# Patient Record
Sex: FEMALE | Race: WHITE
[De-identification: names, ages, dates, MRNs, and addresses within clinical notes are randomized per-mention and may not be internally consistent; named-entity substitution may affect disease eponyms.]

---

## 2017-12-04 ENCOUNTER — APPOINTMENT (OUTPATIENT)
Dept: OBGYN | Facility: CLINIC | Age: 37
End: 2017-12-04

## 2018-02-12 ENCOUNTER — OUTPATIENT (OUTPATIENT)
Dept: OUTPATIENT SERVICES | Facility: HOSPITAL | Age: 38
LOS: 1 days | Discharge: HOME | End: 2018-02-12

## 2018-02-12 ENCOUNTER — APPOINTMENT (OUTPATIENT)
Dept: OBGYN | Facility: CLINIC | Age: 38
End: 2018-02-12

## 2018-02-12 VITALS — DIASTOLIC BLOOD PRESSURE: 80 MMHG | WEIGHT: 208 LBS | BODY MASS INDEX: 35.7 KG/M2 | SYSTOLIC BLOOD PRESSURE: 120 MMHG

## 2018-02-21 DIAGNOSIS — Z01.419 ENCOUNTER FOR GYNECOLOGICAL EXAMINATION (GENERAL) (ROUTINE) WITHOUT ABNORMAL FINDINGS: ICD-10-CM

## 2018-02-26 LAB — HPV HIGH+LOW RISK DNA PNL CVX: NOT DETECTED

## 2018-11-26 ENCOUNTER — TRANSCRIPTION ENCOUNTER (OUTPATIENT)
Age: 38
End: 2018-11-26

## 2019-02-10 ENCOUNTER — EMERGENCY (EMERGENCY)
Facility: HOSPITAL | Age: 39
LOS: 0 days | Discharge: HOME | End: 2019-02-10
Admitting: EMERGENCY MEDICAL TECHNICIAN, BASIC

## 2019-02-10 ENCOUNTER — FORM ENCOUNTER (OUTPATIENT)
Age: 39
End: 2019-02-10

## 2019-02-10 VITALS
HEART RATE: 87 BPM | SYSTOLIC BLOOD PRESSURE: 134 MMHG | DIASTOLIC BLOOD PRESSURE: 80 MMHG | RESPIRATION RATE: 18 BRPM | TEMPERATURE: 96 F | OXYGEN SATURATION: 98 %

## 2019-02-10 DIAGNOSIS — N61.0 MASTITIS WITHOUT ABSCESS: ICD-10-CM

## 2019-02-10 DIAGNOSIS — N64.4 MASTODYNIA: ICD-10-CM

## 2019-02-10 DIAGNOSIS — Z79.2 LONG TERM (CURRENT) USE OF ANTIBIOTICS: ICD-10-CM

## 2019-02-10 DIAGNOSIS — Z88.0 ALLERGY STATUS TO PENICILLIN: ICD-10-CM

## 2019-02-10 NOTE — ED PROVIDER NOTE - PHYSICAL EXAMINATION
VITAL SIGNS: I have reviewed nursing notes and confirm.  CONSTITUTIONAL: Well-developed; well-nourished; in no acute distress.   SKIN: + minimal ender-areola redness to left breast, no discharge expressed from nipple (Chaperoned by Nurse Marisela)  HEAD: Normocephalic; atraumatic.  EYES:  conjunctiva and sclera clear.  ENT: No nasal discharge; airway clear.  EXT: Normal ROM.  No clubbing, cyanosis or edema.   NEURO: Alert, oriented, grossly unremarkable

## 2019-02-10 NOTE — ED PROVIDER NOTE - MEDICAL DECISION MAKING DETAILS
pt has mild redness around left areola, pen allergic so given clidaycin and strict f/u to see breast clinic within 1-2 days

## 2019-02-10 NOTE — ED PROVIDER NOTE - NSFOLLOWUPCLINICS_GEN_ALL_ED_FT
[facility]  Breast  [address]  [place] [address]  Phone: [phone]  Fax:   Follow Up Time:

## 2019-02-10 NOTE — ED PROVIDER NOTE - OBJECTIVE STATEMENT
Pt is a 37y/o female with a pmhx of left breast redness/pain x 2 days. Pt also note spurulent discharge from nipple. Pt denies fever, chills, weakness, numbness. Pt is not currently breast feeding.

## 2019-02-11 ENCOUNTER — OUTPATIENT (OUTPATIENT)
Dept: OUTPATIENT SERVICES | Facility: HOSPITAL | Age: 39
LOS: 1 days | Discharge: HOME | End: 2019-02-11

## 2019-02-11 DIAGNOSIS — N64.52 NIPPLE DISCHARGE: ICD-10-CM

## 2019-02-11 DIAGNOSIS — N64.4 MASTODYNIA: ICD-10-CM

## 2019-02-14 ENCOUNTER — LABORATORY RESULT (OUTPATIENT)
Age: 39
End: 2019-02-14

## 2019-02-15 ENCOUNTER — APPOINTMENT (OUTPATIENT)
Dept: BREAST CENTER | Facility: CLINIC | Age: 39
End: 2019-02-15
Payer: COMMERCIAL

## 2019-02-15 ENCOUNTER — OUTPATIENT (OUTPATIENT)
Dept: OUTPATIENT SERVICES | Facility: HOSPITAL | Age: 39
LOS: 1 days | Discharge: HOME | End: 2019-02-15

## 2019-02-15 VITALS
TEMPERATURE: 98.3 F | HEIGHT: 64 IN | SYSTOLIC BLOOD PRESSURE: 134 MMHG | DIASTOLIC BLOOD PRESSURE: 88 MMHG | WEIGHT: 202 LBS | BODY MASS INDEX: 34.49 KG/M2

## 2019-02-15 DIAGNOSIS — N60.42 MAMMARY DUCT ECTASIA OF LEFT BREAST: ICD-10-CM

## 2019-02-15 PROCEDURE — 99203 OFFICE O/P NEW LOW 30 MIN: CPT

## 2019-02-15 RX ORDER — ZINC OXIDE 13 %
CREAM (GRAM) TOPICAL
Qty: 1 | Refills: 0 | Status: ACTIVE | COMMUNITY
Start: 2019-02-15 | End: 1900-01-01

## 2019-02-15 RX ORDER — SULFAMETHOXAZOLE AND TRIMETHOPRIM 800; 160 MG/1; MG/1
800-160 TABLET ORAL TWICE DAILY
Qty: 14 | Refills: 1 | Status: ACTIVE | COMMUNITY
Start: 2019-02-15 | End: 1900-01-01

## 2019-02-17 ENCOUNTER — LABORATORY RESULT (OUTPATIENT)
Age: 39
End: 2019-02-17

## 2019-02-26 ENCOUNTER — FORM ENCOUNTER (OUTPATIENT)
Age: 39
End: 2019-02-26

## 2019-02-27 ENCOUNTER — OUTPATIENT (OUTPATIENT)
Dept: OUTPATIENT SERVICES | Facility: HOSPITAL | Age: 39
LOS: 1 days | Discharge: HOME | End: 2019-02-27

## 2019-02-27 DIAGNOSIS — R92.8 OTHER ABNORMAL AND INCONCLUSIVE FINDINGS ON DIAGNOSTIC IMAGING OF BREAST: ICD-10-CM

## 2019-02-27 DIAGNOSIS — N63.20 UNSPECIFIED LUMP IN THE LEFT BREAST, UNSPECIFIED QUADRANT: ICD-10-CM

## 2019-03-08 ENCOUNTER — APPOINTMENT (OUTPATIENT)
Dept: BREAST CENTER | Facility: CLINIC | Age: 39
End: 2019-03-08
Payer: COMMERCIAL

## 2019-03-08 VITALS
TEMPERATURE: 98.4 F | BODY MASS INDEX: 34.49 KG/M2 | WEIGHT: 202 LBS | HEIGHT: 64 IN | DIASTOLIC BLOOD PRESSURE: 86 MMHG | SYSTOLIC BLOOD PRESSURE: 124 MMHG

## 2019-03-08 DIAGNOSIS — N60.42 MAMMARY DUCT ECTASIA OF LEFT BREAST: ICD-10-CM

## 2019-03-08 PROCEDURE — 99213 OFFICE O/P EST LOW 20 MIN: CPT

## 2019-03-08 NOTE — ASSESSMENT
[FreeTextEntry1] : Rakel is a 38 premenopausal F with resolved L breast periductal mastitis and a BIRADS 3 right breast mass @10:00, 5 cm FN. \par \par ON exam, I was not able to palpate any suspicious masses in either breast. There was no evidence of infection at this time.  She will be scheduled for a R breast US on 8/12/19.  I will have her follow up after that. \par \par She has been taking Clindamycin for 4 days with minimal relief.  \par I would like her to switch her antibiotics to Bactrim to cover for MRSA as there is a higher resistance to clindamycin in the community.  I have recommended that she continue with warm compresses and breast massages. -- Her cultures grew back staph aureus which was resistant to clindamycin.  \par \par She is otherwise at an average risk for breast cancer and should start screening mammograms at age 42. \par \par ALl of her questions were answered.  She knows to call back with any further questions or concerns. \par \par PLAN: \par -R breast US in 6 months \par -f/up after

## 2019-03-08 NOTE — REVIEW OF SYSTEMS
[Fever] : no fever [Chills] : no chills [As Noted in HPI] : as noted in HPI [Skin Lesions] : no skin lesions [Skin Wound] : no skin wound [Breast Pain] : no breast pain [Breast Lump] : no breast lump [Negative] : Heme/Lymph

## 2019-03-08 NOTE — HISTORY OF PRESENT ILLNESS
[FreeTextEntry1] : Rakel is a 38 F with a left  breast periductal mastitis.\par \par She started to have left sided breast pain five days prior. She also had foul smelling purulent discharge from that nipple.  She was seen in the ED and given a prescription for Clindamycin which she has been taking for the past four days.  She had a slight relief of pain when starting the antibiotics.  She denies any fevers or chills, does think that the left breast is slightly warmer to touch then the right breast, but she denies any induration or redness of the area. \par \par She has no right sided breast complaints. \par \par She had a b/l mammogram and US on 19 which revealed a R breast BIRADS 3 mass @10:00, 5 cm FN, but no abnormal findings in her left breast. \par \par HISTORICAL RISK FACTORS: \par -no prior breast biopsy or surgery \par -family history of breast cancer in a materanl great grandmother \par -, age at first live birth was 31 \par -prior OCP use x 2-3 years, stopped >10 years prior; multiple rounds of IUI and IVF\par \par INTERVAL HISTORY: \par Rakel returns for a 2 week follow up for a left breast mastitis. Her left breast pain has resolved, there is no more nipple discharge, no erythema or induration and no fevers or chills.  She has since completed her antibiotics. \par \par She had a repeat L breast US on 19 which did not reveal any suspicious cystic or solid masses.  \par

## 2019-03-08 NOTE — PHYSICAL EXAM
[Normocephalic] : normocephalic [Atraumatic] : atraumatic [EOMI] : extra ocular movement intact [No Supraclavicular Adenopathy] : no supraclavicular adenopathy [No Cervical Adenopathy] : no cervical adenopathy [Examined in the supine and seated position] : examined in the supine and seated position [No dominant masses] : no dominant masses in right breast  [No dominant masses] : no dominant masses left breast [No Nipple Retraction] : no left nipple retraction [No Nipple Discharge] : no left nipple discharge [No Axillary Lymphadenopathy] : no left axillary lymphadenopathy [Soft] : abdomen soft [Not Tender] : non-tender [No Edema] : no edema [No Rashes] : no rashes [No Ulceration] : no ulceration [de-identified] : no suspicious masses were palpated in either breast, no signs of infection in either breast [de-identified] : no nipple discharge elicited

## 2019-03-08 NOTE — DATA REVIEWED
[FreeTextEntry1] : 2/17/19 -- R breast abscess culture \par -coag neg Staph, resistent to PCN, clindamycin and erythromycin\par \par \par XAM: US BREAST COMPLETE LT \par \par \par PROCEDURE DATE: 02/27/2019 \par \par \par \par INTERPRETATION: Clinical History / Reason for exam: Short-term follow-up. \par \par Additional history: The patient had taken oral antibiotics. \par \par The patient reports her last clinical breast examination was performed \par within the past month. \par \par Family history: Unknown \par \par Comparisons: Priors dated 2/11/2019. \par \par Findings: \par \par Ultrasound: \par \par Unilateral left whole breast ultrasound was performed. \par \par No suspicious solid or cystic masses. No axillary adenopathy. \par \par Impression: No sonographic evidence of malignancy. Please note that the \par patient has a probably benign hypoechoic mass incidentally noted in the \par right breast 10:00 location for which short-term mammographic and \par sonographic follow-up is recommended based on prior report. \par \par Recommendation: Follow-up unilateral diagnostic mammogram in 6 months. \par \par BI-RADS category 3: Probably Benign \par \par The above findings and recommendations were discussed with the patient at \par the time of the examination. \par \par \par \par \par \par \par CARLA BLACKMAN M.D., ATTENDING RADIOLOGIST \par This document has been electronically signed. Feb 27 2019 3:04PM

## 2019-03-08 NOTE — PAST MEDICAL HISTORY
[Menstruating] : The patient is menstruating [Menarche Age ____] : age at menarche was [unfilled] [History of Hormone Replacement Treatment] : has no history of hormone replacement treatment [Definite ___ (Date)] : the last menstrual period was [unfilled] [Total Preg ___] : G[unfilled] [Live Births ___] : P[unfilled]  [Age At Live Birth ___] : Age at live birth: [unfilled] [FreeTextEntry6] : yes IVF x 3, IUI x 1, clomid in past  [FreeTextEntry7] : yes in past x 2-3 years, stopped >10 years prior  [FreeTextEntry8] : yes x 8 months

## 2019-03-20 NOTE — DATA REVIEWED
[FreeTextEntry1] : EXAM: US BREAST LIMITED BI \par EXAM: MG MAMMO DIAG W TAMELA BI# \par \par \par PROCEDURE DATE: 02/11/2019 \par \par \par \par INTERPRETATION: Clinical indication/reason for exam: Patient presents with \par an area of focal pain in the left periareolar breast and nonbloody left \par nipple discharge. \par \par The patient reports her last clinical breast examination was performed over \par one year ago. \par \par Spot magnification imaging of the symptomatic area was performed in addition \par to routine mammographic projections. \par \par Computer-aided detection was utilized in the interpretation of this \par examination. \par \par No comparisons, baseline examination. \par \par Breast composition: There are scattered areas of fibroglandular density. \par \par Right: Oval circumscribed mass in the right superior breast, anterior depth. \par \par Left: There are no suspicious masses, areas of architectural distortion or \par cluster of microcalcifications in the left breast. The spot magnification \par views of the symptomatic area show no suspicious findings. \par \par Targeted Bilateral Breast Sonogram: \par \par Right: At the 10:00 location 5 cm from the nipple is a hypoechoic oval mass \par with circumscribed margins. Left: No solid/cystic lesions or areas of \par abnormal shadowing are seen. \par \par IMPRESSION: Mammographically and sonographically seen left breast oval \par hypoechoic mass with circumscribed margins at the 10:00 location 5 cm from \par the nipple. \par \par No mammographic or sonographic correlate for the reported area of focal pain \par in the left breast. Clinical follow-up is recommended and further management \par of this patient should be based on the level of clinical concern. \par \par Recommendation: Follow-up unilateral diagnostic mammogram and ultrasound in \par 6 months. \par \par BI-RADS category 3: Probably Benign \par \par \par \par \par MARISOL SILVA M.D., RESIDENT RADIOLOGIST \par This document has been electronically signed. \par ENOCH PAUL M.D., ATTENDING RADIOLOGIST \par This document has been electronically signed. Feb 11 2019 4:13PM

## 2019-03-20 NOTE — CONSULT LETTER
[Dear  ___] : Dear  [unfilled], [Consult Letter:] : I had the pleasure of evaluating your patient, [unfilled]. [Please see my note below.] : Please see my note below. [Consult Closing:] : Thank you very much for allowing me to participate in the care of this patient.  If you have any questions, please do not hesitate to contact me. [Sincerely,] : Sincerely, [FreeTextEntry2] : Vannessa Smith MD\par [address] [place] [address]\par [place] [phone] [FreeTextEntry3] : Nikky Antnoio MD \par Breast Surgical Oncologist\par [facility]\par [facility]\par [facility]\par

## 2019-03-20 NOTE — PAST MEDICAL HISTORY
[Menstruating] : The patient is menstruating [Menarche Age ____] : age at menarche was [unfilled] [Definite ___ (Date)] : the last menstrual period was [unfilled] [Total Preg ___] : G[unfilled] [Live Births ___] : P[unfilled]  [Age At Live Birth ___] : Age at live birth: [unfilled] [History of Hormone Replacement Treatment] : has no history of hormone replacement treatment [FreeTextEntry6] : yes IVF x 3, IUI x 1, clomid in past  [FreeTextEntry7] : yes in past x 2-3 years, stopped >10 years prior  [FreeTextEntry8] : yes x 8 months

## 2019-03-20 NOTE — REVIEW OF SYSTEMS
[As Noted in HPI] : as noted in HPI [Breast Pain] : breast pain [Negative] : Heme/Lymph [Fever] : no fever [Chills] : no chills [Skin Lesions] : no skin lesions [Skin Wound] : no skin wound [Breast Lump] : no breast lump

## 2019-03-20 NOTE — HISTORY OF PRESENT ILLNESS
[FreeTextEntry1] : Rakel is a 38 F with a left  breast periductal mastitis.\par \par She started to have left sided breast pain five days prior. She also had foul smelling purulent discharge from that nipple.  She was seen in the ED and given a prescription for Clindamycin which she has been taking for the past four days.  She had a slight relief of pain when starting the antibiotics.  She denies any fevers or chills, does think that the left breast is slightly warmer to touch then the right breast, but she denies any induration or redness of the area. \par \par She has no right sided breast complaints. \par \par She had a b/l mammogram and US on 19 which revealed a R breast BIRADS 3 mass @10:00, 5 cm FN, but no abnormal findings in her left breast. \par \par HISTORICAL RISK FACTORS: \par -no prior breast biopsy or surgery \par -family history of breast cancer in a materanl great grandmother \par -, age at first live birth was 31 \par -prior OCP use x 2-3 years, stopped >10 years prior; multiple rounds of IUI and IVF\par

## 2019-03-20 NOTE — PHYSICAL EXAM
[Normocephalic] : normocephalic [Atraumatic] : atraumatic [EOMI] : extra ocular movement intact [No Supraclavicular Adenopathy] : no supraclavicular adenopathy [No Cervical Adenopathy] : no cervical adenopathy [No dominant masses] : no dominant masses in right breast  [No dominant masses] : no dominant masses left breast [No Nipple Retraction] : no left nipple retraction [No Nipple Discharge] : no left nipple discharge [No Axillary Lymphadenopathy] : no left axillary lymphadenopathy [Soft] : abdomen soft [Not Tender] : non-tender [No Edema] : no edema [No Rashes] : no rashes [No Ulceration] : no ulceration [Examined in the supine and seated position] : examined in the supine and seated position [de-identified] : slightly warmer to touch on the inferior portion of her areola, but no erythema, no induration, no evidence of undrained abscess collection; was able to express about 3 cc of purulent discharge from a single duct in the superior area of her nipple.  This was sent for cultures.

## 2019-03-20 NOTE — ASSESSMENT
[FreeTextEntry1] : Rakel is a 38 premenopausal F with a L breast periductal mastitis and a BIRADS 3 right breast mass @10:00, 5 cm FN. \par \par ON exam, I was not able to palpate any suspicious masses in either breast.  She did have purulent discharge elicited from her left nipple which was sent for culture.  There was no evidence of any undrained abscess collections or significant mastitis.  \par \par She has been taking Clindamycin for 4 days with minimal relief.  \par I would like her to switch her antibiotics to Bactrim to cover for MRSA as there is a higher resistance to clindamycin in the community.  I have recommended that she continue with warm compresses and breast massages. \par \par I would like to repeat a L breast US in 2 weeks with possible US guided aspiration. I will have her follow up with me after repeat imaging. \par \par She is otherwise at an average risk for breast cancer and should start screening mammograms at age 42. \par \par ALl of her questions were answered.  She knows to call back with any further questions or concerns. \par \par PLAN: \par -Bactrim x 1 week \par -repeat L breast US, possible US guided drainage in 2 weeks \par -f/up after imaging

## 2019-04-29 ENCOUNTER — APPOINTMENT (OUTPATIENT)
Dept: BREAST CENTER | Facility: CLINIC | Age: 39
End: 2019-04-29
Payer: COMMERCIAL

## 2019-04-29 VITALS
TEMPERATURE: 98 F | BODY MASS INDEX: 34.49 KG/M2 | HEIGHT: 64 IN | SYSTOLIC BLOOD PRESSURE: 120 MMHG | WEIGHT: 202 LBS | DIASTOLIC BLOOD PRESSURE: 74 MMHG

## 2019-04-29 DIAGNOSIS — N64.4 MASTODYNIA: ICD-10-CM

## 2019-04-29 PROCEDURE — 99213 OFFICE O/P EST LOW 20 MIN: CPT

## 2019-04-29 NOTE — ASSESSMENT
[FreeTextEntry1] : Rakel is a 38 premenopausal F with resolved L breast periductal mastitis and a BIRADS 3 right breast mass @10:00, 5 cm FN; now with right breast pain. \par \par ON exam, I was not able to palpate any suspicious masses in either breast. There was no evidence of infection at this time.  She will be scheduled for a R breast US on 8/12/19.  I will have her follow up after that. \par \par In regards to her breast pain, it may be related to fibrocystic changes within her breast that are hormonally influenced. We spoke about possible interventions including evening primrose oil, supportive bras, and decreasing caffeine intake.  Although none of these have been consistently proven to improve breast pain, they may be tried.  If the pain becomes very severe, there have been studies of tamoxifen being effective for the treatment of breast pain, although there are risks with tamoxifen.  At this time she will try supportive measures.\par \par She has taken Clindamycin for in the past for 1 week with minimal relief.  \par I would like her to switch her antibiotics to Bactrim to cover for MRSA as there is a higher resistance to clindamycin in the community.  I have recommended that she continue with warm compresses and breast massages. -- Her cultures grew back staph aureus which was resistant to clindamycin.  \par \par She is otherwise at an average risk for breast cancer and should start screening mammograms at age 42. \par \par ALl of her questions were answered.  She knows to call back with any further questions or concerns. \par \par PLAN: \par -R breast US in 4 months\par -f/up after

## 2019-04-29 NOTE — PAST MEDICAL HISTORY
[Menstruating] : The patient is menstruating [Menarche Age ____] : age at menarche was [unfilled] [Total Preg ___] : G[unfilled] [Live Births ___] : P[unfilled]  [Age At Live Birth ___] : Age at live birth: [unfilled] [History of Hormone Replacement Treatment] : has no history of hormone replacement treatment [Definite ___ (Date)] : the last menstrual period was [unfilled] [FreeTextEntry6] : yes IVF x 3, IUI x 1, clomid in past  [FreeTextEntry7] : yes in past x 2-3 years, stopped >10 years prior  [FreeTextEntry8] : yes x 8 months

## 2019-04-29 NOTE — PHYSICAL EXAM
[Normocephalic] : normocephalic [Atraumatic] : atraumatic [EOMI] : extra ocular movement intact [No Supraclavicular Adenopathy] : no supraclavicular adenopathy [No Cervical Adenopathy] : no cervical adenopathy [Examined in the supine and seated position] : examined in the supine and seated position [No dominant masses] : no dominant masses in right breast  [No dominant masses] : no dominant masses left breast [No Nipple Retraction] : no left nipple retraction [No Nipple Discharge] : no left nipple discharge [No Axillary Lymphadenopathy] : no left axillary lymphadenopathy [Soft] : abdomen soft [Not Tender] : non-tender [No Edema] : no edema [No Rashes] : no rashes [No Ulceration] : no ulceration [de-identified] : no suspicious masses were palpated in either breast, no signs of infection in either breast [de-identified] : no suspicious mass palpated, no other skin changes or signs of infection [de-identified] : no nipple discharge elicited

## 2019-04-29 NOTE — HISTORY OF PRESENT ILLNESS
[FreeTextEntry1] : Rakel is a 38 F with a left  breast periductal mastitis.\par \par She started to have left sided breast pain five days prior. She also had foul smelling purulent discharge from that nipple.  She was seen in the ED and given a prescription for Clindamycin which she has been taking for the past four days.  She had a slight relief of pain when starting the antibiotics.  She denies any fevers or chills, does think that the left breast is slightly warmer to touch then the right breast, but she denies any induration or redness of the area. \par \par She has no right sided breast complaints. \par \par She had a b/l mammogram and US on 19 which revealed a R breast BIRADS 3 mass @10:00, 5 cm FN, but no abnormal findings in her left breast. \par \par HISTORICAL RISK FACTORS: \par -no prior breast biopsy or surgery \par -family history of breast cancer in a materanl great grandmother \par -, age at first live birth was 31 \par -prior OCP use x 2-3 years, stopped >10 years prior; multiple rounds of IUI and IVF\par \par INTERVAL HISTORY: \par Rakel returns for a problem visit, with a past history of left breast mastitis. She returns because she is having right LIQ pain.  This pain is intermittent and not associated with her menses.  She denies any other skin changes, no nipple discharge or retraction.  \par \par Her left breast pain has resolved, there is no nipple discharge, no erythema or induration and no fevers or chills. \par \par She had a repeat L breast US on 19 which did not reveal any suspicious cystic or solid masses.  \par

## 2019-08-11 ENCOUNTER — FORM ENCOUNTER (OUTPATIENT)
Age: 39
End: 2019-08-11

## 2019-08-12 ENCOUNTER — OUTPATIENT (OUTPATIENT)
Dept: OUTPATIENT SERVICES | Facility: HOSPITAL | Age: 39
LOS: 1 days | Discharge: HOME | End: 2019-08-12
Payer: COMMERCIAL

## 2019-08-12 DIAGNOSIS — R92.8 OTHER ABNORMAL AND INCONCLUSIVE FINDINGS ON DIAGNOSTIC IMAGING OF BREAST: ICD-10-CM

## 2019-08-12 PROCEDURE — 76642 ULTRASOUND BREAST LIMITED: CPT | Mod: 26,RT

## 2019-08-16 ENCOUNTER — APPOINTMENT (OUTPATIENT)
Dept: BREAST CENTER | Facility: CLINIC | Age: 39
End: 2019-08-16
Payer: COMMERCIAL

## 2019-08-16 VITALS
TEMPERATURE: 98.4 F | HEIGHT: 64 IN | DIASTOLIC BLOOD PRESSURE: 80 MMHG | SYSTOLIC BLOOD PRESSURE: 102 MMHG | BODY MASS INDEX: 34.66 KG/M2 | WEIGHT: 203 LBS

## 2019-08-16 DIAGNOSIS — R92.8 OTHER ABNORMAL AND INCONCLUSIVE FINDINGS ON DIAGNOSTIC IMAGING OF BREAST: ICD-10-CM

## 2019-08-16 PROCEDURE — 99213 OFFICE O/P EST LOW 20 MIN: CPT

## 2019-08-16 NOTE — REVIEW OF SYSTEMS
[As Noted in HPI] : as noted in HPI [Negative] : Heme/Lymph [Fever] : no fever [Chills] : no chills [Skin Lesions] : no skin lesions [Skin Wound] : no skin wound [Breast Pain] : no breast pain [Breast Lump] : no breast lump

## 2019-08-16 NOTE — PHYSICAL EXAM
[Normocephalic] : normocephalic [Atraumatic] : atraumatic [EOMI] : extra ocular movement intact [No Supraclavicular Adenopathy] : no supraclavicular adenopathy [No Cervical Adenopathy] : no cervical adenopathy [Examined in the supine and seated position] : examined in the supine and seated position [No dominant masses] : no dominant masses in right breast  [No dominant masses] : no dominant masses left breast [No Nipple Retraction] : no left nipple retraction [No Nipple Discharge] : no left nipple discharge [No Axillary Lymphadenopathy] : no left axillary lymphadenopathy [Soft] : abdomen soft [Not Tender] : non-tender [No Edema] : no edema [No Rashes] : no rashes [No Ulceration] : no ulceration [de-identified] : no suspicious masses were palpated in either breast, no signs of infection in either breast [de-identified] : no suspicious mass palpated, no other skin changes or signs of infection [de-identified] : no nipple discharge elicited

## 2019-08-16 NOTE — ASSESSMENT
[FreeTextEntry1] : Rakel is a 39 premenopausal F with resolved L breast periductal mastitis and a BIRADS 3 right breast mass @10:00, 5 cm FN. \par \par She has had no recurrence of her left breast abscess and otherwise has no other breast related complaints. \par ON exam, I was not able to palpate any suspicious masses in either breast. There was no evidence of infection at this time.  Her most recent imaging was a R breast US on 8/12/19 which revealed stability of her right breast mass @10N5, measuring 0.8 x 1.2 x 0.5 cm, deemed BIRADS 3.  She will be scheduled for her repeat R breast US on 2/12/2020.  I will have her follow up after that. \par AS REVIEW: \par In regards to her breast pain, it may be related to fibrocystic changes within her breast that are hormonally influenced. We spoke about possible interventions including evening primrose oil, supportive bras, and decreasing caffeine intake.  Although none of these have been consistently proven to improve breast pain, they may be tried.  If the pain becomes very severe, there have been studies of tamoxifen being effective for the treatment of breast pain, although there are risks with tamoxifen.  At this time she will try supportive measures.\par \par She has taken Clindamycin for in the past for 1 week with minimal relief.  \par I would like her to switch her antibiotics to Bactrim to cover for MRSA as there is a higher resistance to clindamycin in the community.  I have recommended that she continue with warm compresses and breast massages. -- Her cultures grew back staph aureus which was resistant to clindamycin.  \par \par She is otherwise at an average risk for breast cancer and should start screening mammograms at age 42. \par \par ALl of her questions were answered.  She knows to call back with any further questions or concerns. \par \par PLAN: \par -R breast US 2/12/2020\par -f/up after

## 2019-08-16 NOTE — DATA REVIEWED
[FreeTextEntry1] : EXAM: US BREAST LIMITED RT \par \par \par PROCEDURE DATE: 08/12/2019 \par \par \par \par INTERPRETATION: Clinical History / Reason for exam: Short-term follow-up of \par a probably benign right breast mass. This was first identified in February \par 2019. \par \par The patient reports her last clinical breast examination was performed \par within the past year. \par \par Family history: Unknown \par \par Comparisons: Priors dating back to February 2019. \par \par Findings: \par \par Ultrasound: \par \par Targeted unilateral right breast ultrasound was performed. \par \par At the 10:00 position 5 cm from the nipple, there is a stable hypoechoic \par mass measuring 0.8 x 1.2 x 0.5 cm. \par \par Impression: Probably benign right breast mass for which short-term \par sonographic follow-up is recommended. \par \par Recommendation: Follow-up breast ultrasound in 6 months. \par \par BI-RADS category 3: Probably Benign \par \par \par \par \par The above findings and recommendations were discussed with the patient at \par the time of the examination. \par \par \par \par \par \par \par CARLA BLACKMAN M.D., ATTENDING RADIOLOGIST \par This document has been electronically signed. Aug 12 2019 9:57AM \par \par

## 2019-08-16 NOTE — HISTORY OF PRESENT ILLNESS
[FreeTextEntry1] : Rakel is a 39 F with a left  breast periductal mastitis.\par \par She started to have left sided breast pain five days prior. She also had foul smelling purulent discharge from that nipple.  She was seen in the ED and given a prescription for Clindamycin which she has been taking for the past four days.  She had a slight relief of pain when starting the antibiotics.  She denies any fevers or chills, does think that the left breast is slightly warmer to touch then the right breast, but she denies any induration or redness of the area. \par \par She has no right sided breast complaints. \par \par She had a b/l mammogram and US on 19 which revealed a R breast BIRADS 3 mass @10:00, 5 cm FN, but no abnormal findings in her left breast. \par \par HISTORICAL RISK FACTORS: \par -no prior breast biopsy or surgery \par -family history of breast cancer in a materanl great grandmother \par -, age at first live birth was 31 \par -prior OCP use x 2-3 years, stopped >10 years prior; multiple rounds of IUI and IVF\par \par INTERVAL HISTORY: \par Rakel returns for a 6 month follow up for a past history of left breast mastitis. \par \par She has no breast related complaints at this time.  She denies any breast pain, has not palpated any new palpable masses in either breast and denies any nipple discharge or retraction.  She has not had any recurrence of her infection.  \par \par Her most recent imaging was performed on 19, a R breast US which revealed @10N5, a stable hypoechoic mass measuring 0.8 x 1.2 x 0.5 cm, deemed BIRADS 3.

## 2019-10-25 ENCOUNTER — EMERGENCY (EMERGENCY)
Facility: HOSPITAL | Age: 39
LOS: 0 days | Discharge: HOME | End: 2019-10-25
Attending: EMERGENCY MEDICINE | Admitting: EMERGENCY MEDICINE
Payer: COMMERCIAL

## 2019-10-25 VITALS
OXYGEN SATURATION: 99 % | TEMPERATURE: 97 F | DIASTOLIC BLOOD PRESSURE: 82 MMHG | RESPIRATION RATE: 16 BRPM | SYSTOLIC BLOOD PRESSURE: 137 MMHG | HEART RATE: 79 BPM

## 2019-10-25 VITALS
OXYGEN SATURATION: 99 % | SYSTOLIC BLOOD PRESSURE: 125 MMHG | DIASTOLIC BLOOD PRESSURE: 73 MMHG | HEART RATE: 70 BPM | RESPIRATION RATE: 16 BRPM | TEMPERATURE: 98 F

## 2019-10-25 DIAGNOSIS — Z88.0 ALLERGY STATUS TO PENICILLIN: ICD-10-CM

## 2019-10-25 DIAGNOSIS — R07.89 OTHER CHEST PAIN: ICD-10-CM

## 2019-10-25 DIAGNOSIS — R07.9 CHEST PAIN, UNSPECIFIED: ICD-10-CM

## 2019-10-25 LAB
ALBUMIN SERPL ELPH-MCNC: 4.2 G/DL — SIGNIFICANT CHANGE UP (ref 3.5–5.2)
ALP SERPL-CCNC: 51 U/L — SIGNIFICANT CHANGE UP (ref 30–115)
ALT FLD-CCNC: 18 U/L — SIGNIFICANT CHANGE UP (ref 0–41)
ANION GAP SERPL CALC-SCNC: 10 MMOL/L — SIGNIFICANT CHANGE UP (ref 7–14)
AST SERPL-CCNC: 15 U/L — SIGNIFICANT CHANGE UP (ref 0–41)
BASOPHILS # BLD AUTO: 0.04 K/UL — SIGNIFICANT CHANGE UP (ref 0–0.2)
BASOPHILS NFR BLD AUTO: 0.7 % — SIGNIFICANT CHANGE UP (ref 0–1)
BILIRUB SERPL-MCNC: 0.3 MG/DL — SIGNIFICANT CHANGE UP (ref 0.2–1.2)
BUN SERPL-MCNC: 11 MG/DL — SIGNIFICANT CHANGE UP (ref 10–20)
CALCIUM SERPL-MCNC: 9 MG/DL — SIGNIFICANT CHANGE UP (ref 8.5–10.1)
CHLORIDE SERPL-SCNC: 103 MMOL/L — SIGNIFICANT CHANGE UP (ref 98–110)
CO2 SERPL-SCNC: 27 MMOL/L — SIGNIFICANT CHANGE UP (ref 17–32)
CREAT SERPL-MCNC: 0.7 MG/DL — SIGNIFICANT CHANGE UP (ref 0.7–1.5)
EOSINOPHIL # BLD AUTO: 0.13 K/UL — SIGNIFICANT CHANGE UP (ref 0–0.7)
EOSINOPHIL NFR BLD AUTO: 2.3 % — SIGNIFICANT CHANGE UP (ref 0–8)
GLUCOSE SERPL-MCNC: 88 MG/DL — SIGNIFICANT CHANGE UP (ref 70–99)
HCT VFR BLD CALC: 37.4 % — SIGNIFICANT CHANGE UP (ref 37–47)
HGB BLD-MCNC: 13.1 G/DL — SIGNIFICANT CHANGE UP (ref 12–16)
IMM GRANULOCYTES NFR BLD AUTO: 0.4 % — HIGH (ref 0.1–0.3)
LYMPHOCYTES # BLD AUTO: 2.07 K/UL — SIGNIFICANT CHANGE UP (ref 1.2–3.4)
LYMPHOCYTES # BLD AUTO: 36.4 % — SIGNIFICANT CHANGE UP (ref 20.5–51.1)
MCHC RBC-ENTMCNC: 30.7 PG — SIGNIFICANT CHANGE UP (ref 27–31)
MCHC RBC-ENTMCNC: 35 G/DL — SIGNIFICANT CHANGE UP (ref 32–37)
MCV RBC AUTO: 87.6 FL — SIGNIFICANT CHANGE UP (ref 81–99)
MONOCYTES # BLD AUTO: 0.38 K/UL — SIGNIFICANT CHANGE UP (ref 0.1–0.6)
MONOCYTES NFR BLD AUTO: 6.7 % — SIGNIFICANT CHANGE UP (ref 1.7–9.3)
NEUTROPHILS # BLD AUTO: 3.04 K/UL — SIGNIFICANT CHANGE UP (ref 1.4–6.5)
NEUTROPHILS NFR BLD AUTO: 53.5 % — SIGNIFICANT CHANGE UP (ref 42.2–75.2)
NRBC # BLD: 0 /100 WBCS — SIGNIFICANT CHANGE UP (ref 0–0)
PLATELET # BLD AUTO: 203 K/UL — SIGNIFICANT CHANGE UP (ref 130–400)
POTASSIUM SERPL-MCNC: 4.3 MMOL/L — SIGNIFICANT CHANGE UP (ref 3.5–5)
POTASSIUM SERPL-SCNC: 4.3 MMOL/L — SIGNIFICANT CHANGE UP (ref 3.5–5)
PROT SERPL-MCNC: 6.9 G/DL — SIGNIFICANT CHANGE UP (ref 6–8)
RBC # BLD: 4.27 M/UL — SIGNIFICANT CHANGE UP (ref 4.2–5.4)
RBC # FLD: 12.6 % — SIGNIFICANT CHANGE UP (ref 11.5–14.5)
SODIUM SERPL-SCNC: 140 MMOL/L — SIGNIFICANT CHANGE UP (ref 135–146)
TROPONIN T SERPL-MCNC: <0.01 NG/ML — SIGNIFICANT CHANGE UP
WBC # BLD: 5.68 K/UL — SIGNIFICANT CHANGE UP (ref 4.8–10.8)
WBC # FLD AUTO: 5.68 K/UL — SIGNIFICANT CHANGE UP (ref 4.8–10.8)

## 2019-10-25 PROCEDURE — 99285 EMERGENCY DEPT VISIT HI MDM: CPT

## 2019-10-25 PROCEDURE — 93010 ELECTROCARDIOGRAM REPORT: CPT

## 2019-10-25 PROCEDURE — 71046 X-RAY EXAM CHEST 2 VIEWS: CPT | Mod: 26

## 2019-10-25 NOTE — ED PROVIDER NOTE - ATTENDING CONTRIBUTION TO CARE
HPI-As noted above, interviewed the patient myself, additionally: 39F p/w cp x 3, midsternal , non radiating, constantly, pleuritic.  ROS-As noted above, additionally: (+):   (-): fever, chills, n/v, cp, sob, palpitations, diaphoresis, cough, ha/dizziness, neck pain, abd pain, diarrhea, constipation, melena/brbpr, urinary symptoms, weakness, numbness/tingling, edema, calf pain/swelling/erythema, sick contacts, recent travel, trauma or rash.   VS- reviewed initial VS.    PE-As noted above, additionally:   Gen:a&o, nad   Eyes:perrl, eomi   Ent:moist mm, nl voice  Neck:from, supple, (-)jvd ,c-spine tender/step-offs/lymphadenopathy/meningismus   Cv: s1,s2, rrr  Chest:ctab  Abd:soft,ntnd  Ext:from on all ext, distal pulses present, (-) edema   Skin: nl color for race (-)rash   Neuro: anox3,cn 2-12 grossly intact motor/sensory      MDM- Vitals wnl. Due to the concern for _, we ordered _.Labs ordered/reviewed- wnl. HPI-As noted above, interviewed the patient myself, additionally: 39F p/w cp x 3, midsternal , non radiating, constantly, pleuritic.  ROS-As noted above, additionally: (+): cp  (-): fever, chills, n/v, cp, sob, palpitations, diaphoresis, cough, ha/dizziness, neck pain, abd pain, diarrhea, constipation, melena/brbpr, urinary symptoms, weakness, numbness/tingling, edema, calf pain/swelling/erythema, sick contacts, recent travel, trauma or rash.   VS- reviewed initial VS.    PE-As noted above, additionally:   Gen:a&o, nad   Eyes:perrl, eomi   Ent:moist mm, nl voice  Neck:from, supple, (-)jvd ,c-spine tender/step-offs/lymphadenopathy/meningismus   Cv: s1,s2, rrr  Chest:ctab  Abd:soft,ntnd  Ext:from on all ext, distal pulses present, (-) edema   Skin: nl color for race (-)rash   Neuro: anox3,cn 2-12 grossly intact motor/sensory

## 2019-10-25 NOTE — ED PROVIDER NOTE - NSFOLLOWUPINSTRUCTIONS_ED_ALL_ED_FT
Nonspecific Chest Pain  ImageChest pain can be caused by many different conditions. There is always a chance that your pain could be related to something serious, such as a heart attack or a blood clot in your lungs. Chest pain can also be caused by conditions that are not life-threatening. If you have chest pain, it is very important to follow up with your health care provider.    What are the causes?  Causes of this condition include:    Heartburn.  Pneumonia or bronchitis.  Anxiety or stress.  Inflammation around your heart (pericarditis) or lung (pleuritis or pleurisy).  A blood clot in your lung.  A collapsed lung (pneumothorax). This can develop suddenly on its own (spontaneous pneumothorax) or from trauma to the chest.  Shingles infection (varicella-zoster virus).  Heart attack.  Damage to the bones, muscles, and cartilage that make up your chest wall. This can include:    Bruised bones due to injury.  Strained muscles or cartilage due to frequent or repeated coughing or overwork.  Fracture to one or more ribs.  Sore cartilage due to inflammation (costochondritis).      What increases the risk?  Risk factors for this condition may include:    Activities that increase your risk for trauma or injury to your chest.  Respiratory infections or conditions that cause frequent coughing.  Medical conditions or overeating that can cause heartburn.  Heart disease or family history of heart disease.  Conditions or health behaviors that increase your risk of developing a blood clot.  Having had chicken pox (varicella zoster).    What are the signs or symptoms?  Chest pain can feel like:    Burning or tingling on the surface of your chest or deep in your chest.  Crushing, pressure, aching, or squeezing pain.  Dull or sharp pain that is worse when you move, cough, or take a deep breath.  Pain that is also felt in your back, neck, shoulder, or arm, or pain that spreads to any of these areas.    Your chest pain may come and go, or it may stay constant.    How is this diagnosed?  Lab tests or other studies may be needed to find the cause of your pain. Your health care provider may have you take a test called an ECG (electrocardiogram). An ECG records your heartbeat patterns at the time the test is performed. You may also have other tests, such as:    Transthoracic echocardiogram (TTE). In this test, sound waves are used to create a picture of the heart structures and to look at how blood flows through your heart.  Transesophageal echocardiogram (ART). This is a more advanced imaging test that takes images from inside your body. It allows your health care provider to see your heart in finer detail.  Cardiac monitoring. This allows your health care provider to monitor your heart rate and rhythm in real time.  Holter monitor. This is a portable device that records your heartbeat and can help to diagnose abnormal heartbeats. It allows your health care provider to track your heart activity for several days, if needed.  Stress tests. These can be done through exercise or by taking medicine that makes your heart beat more quickly.  Blood tests.  Other imaging tests.    How is this treated?  Treatment depends on what is causing your chest pain. Treatment may include:    Medicines. These may include:    Acid blockers for heartburn.  Anti-inflammatory medicine.  Pain medicine for inflammatory conditions.  Antibiotic medicine, if an infection is present.  Medicines to dissolve blood clots.  Medicines to treat coronary artery disease (CAD).    Supportive care for conditions that do not require medicines. This may include:    Resting.  Applying heat or cold packs to injured areas.  Limiting activities until pain decreases.      Follow these instructions at home:  Medicines     If you were prescribed an antibiotic, take it as told by your health care provider. Do not stop taking the antibiotic even if you start to feel better.  Take over-the-counter and prescription medicines only as told by your health care provider.  Lifestyle     Do not use any products that contain nicotine or tobacco, such as cigarettes and e-cigarettes. If you need help quitting, ask your health care provider.  Do not drink alcohol.  ImageMake lifestyle changes as directed by your health care provider. These may include:    Getting regular exercise. Ask your health care provider to suggest some activities that are safe for you.  Eating a heart-healthy diet. A registered dietitian can help you to learn healthy eating options.  Maintaining a healthy weight.  Managing diabetes, if necessary.  Reducing stress, such as with yoga or relaxation techniques.    General instructions     Avoid any activities that bring on chest pain.  If heartburn is the cause for your chest pain, raise (elevate) the head of your bed about 6 inches (15 cm) by putting blocks under the legs. Sleeping with more pillows does not effectively relieve heartburn because it only changes the position of your head.  Keep all follow-up visits as told by your health care provider. This is important. This includes any further testing if your chest pain does not go away.  Contact a health care provider if:  Your chest pain does not go away.  You have a rash with blisters on your chest.  You have a fever.  You have chills.  Get help right away if:  Your chest pain is worse.  You have a cough that gets worse, or you cough up blood.  You have severe pain in your abdomen.  You have severe weakness.  You faint.  You have sudden, unexplained chest discomfort.  You have sudden, unexplained discomfort in your arms, back, neck, or jaw.  You have shortness of breath at any time.  You suddenly start to sweat, or your skin gets clammy.  You feel nauseous or you vomit.  You suddenly feel light-headed or dizzy.  Your heart begins to beat quickly, or it feels like it is skipping beats.  These symptoms may represent a serious problem that is an emergency. Do not wait to see if the symptoms will go away. Get medical help right away. Call your local emergency services (911 in the [place] Do not drive yourself to the hospital.     This information is not intended to replace advice given to you by your health care provider. Make sure you discuss any questions you have with your health care provider.

## 2019-10-25 NOTE — ED PROVIDER NOTE - OBJECTIVE STATEMENT
40 yo female with no pmhx presenting with chest pain. Pt has had sharp intermittent exertional non-radiating mid-sternal chest pain that began 3 days and for the past day has been worsening and now constant, waking her up from sleep last night associated with SOB. Pt also reporting intermittent headaches for the past 3 weeks. Denies cough, fever, nausea, vomiting, abdominal pain, dysuria, recent travel, calf swelling. 38 yo female with no pmhx presenting with chest pain. Pt has had sharp intermittent exertional non-radiating mid-sternal chest pain that began 3 days and for the past day worse with inspiration, cp is now constant, waking her up from sleep last night associated with SOB. Pt also reporting intermittent headaches for the past 3 weeks. Denies cough, fever, neck stiffness, nausea, vomiting, abdominal pain, dysuria, recent travel, calf swelling.

## 2019-10-25 NOTE — ED ADULT NURSE NOTE - OBJECTIVE STATEMENT
pt comes in with complaints of migraine that started on monday. went to pmd who told her that her bp was low. took blood but doesn't have report. then 2 days ago developed left sided chest pain. hurt to take deep breathe. non radiating. . woke her up from sleep this morning as a tightness.

## 2019-10-25 NOTE — ED PROVIDER NOTE - NS ED ROS FT
Constitutional:  see HPI  Head:  no headache, dizziness, loss of consciousness  Eyes:  no visual changes; no eye pain, redness, or discharge  ENMT:  no ear pain or discharge; no hearing problems; no mouth or throat sores or lesions; no throat pain  Cardiac: chest pain; no tachycardia or palpitations  Respiratory: no cough, wheezing, shortness of breath, chest tightness, or trouble breathing  GI: no nausea, vomiting, diarrhea or abdominal pain  :  no dysuria, frequency, or burning with urination; no change in urine output  MS: no myalgias, muscle weakness, joint pain,or  injury; no joint swelling  Neuro: no weakness; no numbness or tingling; no seizure  Skin:  no rashes or color changes; no lacerations or abrasions

## 2019-10-25 NOTE — ED PROVIDER NOTE - PATIENT PORTAL LINK FT
You can access the FollowMyHealth Patient Portal offered by [facility] by registering at the following website: [email or web address]. By joining [email or web address] FollowMyHealth portal, you will also be able to view your health information using other applications (apps) compatible with our system.

## 2019-10-25 NOTE — ED PROVIDER NOTE - CLINICAL SUMMARY MEDICAL DECISION MAKING FREE TEXT BOX
MDM- Vitals wnl. Due to the concern for acs, we ordered, Labs ordered/reviewed- wnl. trop-neg. ED cxr reviewed by me- (-) ptx,infiltrates,effusion. low heart score. symptoms ongoing for 3 days, unlikely acs. dc home with cardio f/u

## 2019-10-25 NOTE — ED PROVIDER NOTE - NSFOLLOWUPCLINICS_GEN_ALL_ED_FT
[facility] [address] [address]  Phone: [phone]  Fax:   Follow Up Time: Routine

## 2019-12-11 ENCOUNTER — TRANSCRIPTION ENCOUNTER (OUTPATIENT)
Age: 39
End: 2019-12-11

## 2020-02-24 PROBLEM — Z78.9 OTHER SPECIFIED HEALTH STATUS: Chronic | Status: ACTIVE | Noted: 2019-10-25

## 2020-03-09 ENCOUNTER — FORM ENCOUNTER (OUTPATIENT)
Age: 40
End: 2020-03-09

## 2020-03-10 ENCOUNTER — OUTPATIENT (OUTPATIENT)
Dept: OUTPATIENT SERVICES | Facility: HOSPITAL | Age: 40
LOS: 1 days | Discharge: HOME | End: 2020-03-10
Payer: COMMERCIAL

## 2020-03-10 DIAGNOSIS — R92.8 OTHER ABNORMAL AND INCONCLUSIVE FINDINGS ON DIAGNOSTIC IMAGING OF BREAST: ICD-10-CM

## 2020-03-10 PROCEDURE — 76641 ULTRASOUND BREAST COMPLETE: CPT | Mod: 26,RT

## 2020-03-13 ENCOUNTER — APPOINTMENT (OUTPATIENT)
Dept: BREAST CENTER | Facility: CLINIC | Age: 40
End: 2020-03-13

## 2020-03-26 LAB — BACTERIA WND CULT: NORMAL

## 2020-04-16 ENCOUNTER — OUTPATIENT (OUTPATIENT)
Dept: OUTPATIENT SERVICES | Facility: HOSPITAL | Age: 40
LOS: 1 days | Discharge: HOME | End: 2020-04-16
Payer: COMMERCIAL

## 2020-04-16 DIAGNOSIS — R05 COUGH: ICD-10-CM

## 2020-04-16 PROCEDURE — 71046 X-RAY EXAM CHEST 2 VIEWS: CPT | Mod: 26

## 2020-07-31 ENCOUNTER — APPOINTMENT (OUTPATIENT)
Dept: OBGYN | Facility: CLINIC | Age: 40
End: 2020-07-31
Payer: COMMERCIAL

## 2020-07-31 VITALS
DIASTOLIC BLOOD PRESSURE: 66 MMHG | BODY MASS INDEX: 35.85 KG/M2 | SYSTOLIC BLOOD PRESSURE: 100 MMHG | WEIGHT: 210 LBS | HEIGHT: 64 IN

## 2020-07-31 DIAGNOSIS — Z31.83 ENCOUNTER FOR ASSISTED REPRODUCTIVE FERTILITY PROCEDURE CYCLE: ICD-10-CM

## 2020-07-31 PROCEDURE — 99395 PREV VISIT EST AGE 18-39: CPT

## 2020-07-31 RX ORDER — NORETHINDRONE ACETATE AND ETHINYL ESTRADIOL AND FERROUS FUMARATE 1MG-20(21)
1-20 KIT ORAL DAILY
Qty: 28 | Refills: 6 | Status: ACTIVE | COMMUNITY
Start: 2020-07-31 | End: 1900-01-01

## 2020-07-31 NOTE — PHYSICAL EXAM
[Awake] : awake [Alert] : alert [Soft] : soft [Oriented x3] : oriented to person, place, and time [Labia Majora] : labia major [Labia Minora] : labia minora [Normal] : clitoris [No Bleeding] : there was no active vaginal bleeding [Uterine Adnexae] : were not tender and not enlarged [Acute Distress] : no acute distress [Mass] : no breast mass [Nipple Discharge] : no nipple discharge [Axillary LAD] : no axillary lymphadenopathy [Tender] : non tender [Discharge] : a  ~M vaginal discharge was present [Moderate] : moderate [Chey] : yellow [Thick] : thick

## 2020-07-31 NOTE — HISTORY OF PRESENT ILLNESS
[Last Pap ___] : Last cervical pap smear was [unfilled] [Reproductive Age] : is of reproductive age [Definite:  ___ (Date)] : the last menstrual period was [unfilled] [Normal Amount/Duration] : was of a normal amount and duration [Sexually Active] : is sexually active [Monogamous] : is monogamous [Male ___] : [unfilled] male [de-identified] : up to date [Spotting Between  Menses] : no spotting between menses

## 2020-08-05 DIAGNOSIS — B37.9 CANDIDIASIS, UNSPECIFIED: ICD-10-CM

## 2020-08-05 LAB
A VAGINAE DNA VAG QL NAA+PROBE: NORMAL
BVAB2 DNA VAG QL NAA+PROBE: NORMAL
C KRUSEI DNA VAG QL NAA+PROBE: NEGATIVE
C KRUSEI DNA VAG QL NAA+PROBE: POSITIVE
C TRACH RRNA SPEC QL NAA+PROBE: NEGATIVE
MEGA1 DNA VAG QL NAA+PROBE: NORMAL
N GONORRHOEA RRNA SPEC QL NAA+PROBE: NEGATIVE
T VAGINALIS RRNA SPEC QL NAA+PROBE: NEGATIVE

## 2020-08-05 RX ORDER — TERCONAZOLE 4 MG/G
0.4 CREAM VAGINAL
Qty: 1 | Refills: 2 | Status: ACTIVE | COMMUNITY
Start: 2020-08-05 | End: 1900-01-01

## 2020-08-14 ENCOUNTER — TRANSCRIPTION ENCOUNTER (OUTPATIENT)
Age: 40
End: 2020-08-14

## 2020-12-02 ENCOUNTER — EMERGENCY (EMERGENCY)
Facility: HOSPITAL | Age: 40
LOS: 0 days | Discharge: HOME | End: 2020-12-02
Attending: EMERGENCY MEDICINE | Admitting: EMERGENCY MEDICINE
Payer: COMMERCIAL

## 2020-12-02 VITALS
RESPIRATION RATE: 20 BRPM | DIASTOLIC BLOOD PRESSURE: 90 MMHG | OXYGEN SATURATION: 98 % | TEMPERATURE: 100 F | WEIGHT: 201.94 LBS | SYSTOLIC BLOOD PRESSURE: 138 MMHG | HEART RATE: 102 BPM

## 2020-12-02 VITALS
OXYGEN SATURATION: 97 % | SYSTOLIC BLOOD PRESSURE: 129 MMHG | HEART RATE: 97 BPM | RESPIRATION RATE: 18 BRPM | TEMPERATURE: 98 F | DIASTOLIC BLOOD PRESSURE: 82 MMHG

## 2020-12-02 DIAGNOSIS — N39.0 URINARY TRACT INFECTION, SITE NOT SPECIFIED: ICD-10-CM

## 2020-12-02 DIAGNOSIS — R55 SYNCOPE AND COLLAPSE: ICD-10-CM

## 2020-12-02 DIAGNOSIS — R10.13 EPIGASTRIC PAIN: ICD-10-CM

## 2020-12-02 DIAGNOSIS — R51.9 HEADACHE, UNSPECIFIED: ICD-10-CM

## 2020-12-02 DIAGNOSIS — Z88.0 ALLERGY STATUS TO PENICILLIN: ICD-10-CM

## 2020-12-02 LAB
ALBUMIN SERPL ELPH-MCNC: 4.7 G/DL — SIGNIFICANT CHANGE UP (ref 3.5–5.2)
ALP SERPL-CCNC: 56 U/L — SIGNIFICANT CHANGE UP (ref 30–115)
ALT FLD-CCNC: 25 U/L — SIGNIFICANT CHANGE UP (ref 0–41)
ANION GAP SERPL CALC-SCNC: 11 MMOL/L — SIGNIFICANT CHANGE UP (ref 7–14)
APPEARANCE UR: CLEAR — SIGNIFICANT CHANGE UP
AST SERPL-CCNC: 21 U/L — SIGNIFICANT CHANGE UP (ref 0–41)
BACTERIA # UR AUTO: ABNORMAL
BASOPHILS # BLD AUTO: 0.02 K/UL — SIGNIFICANT CHANGE UP (ref 0–0.2)
BASOPHILS NFR BLD AUTO: 0.2 % — SIGNIFICANT CHANGE UP (ref 0–1)
BILIRUB SERPL-MCNC: 0.5 MG/DL — SIGNIFICANT CHANGE UP (ref 0.2–1.2)
BILIRUB UR-MCNC: NEGATIVE — SIGNIFICANT CHANGE UP
BUN SERPL-MCNC: 15 MG/DL — SIGNIFICANT CHANGE UP (ref 10–20)
CALCIUM SERPL-MCNC: 9.7 MG/DL — SIGNIFICANT CHANGE UP (ref 8.5–10.1)
CHLORIDE SERPL-SCNC: 99 MMOL/L — SIGNIFICANT CHANGE UP (ref 98–110)
CO2 SERPL-SCNC: 27 MMOL/L — SIGNIFICANT CHANGE UP (ref 17–32)
COLOR SPEC: YELLOW — SIGNIFICANT CHANGE UP
CREAT SERPL-MCNC: 0.9 MG/DL — SIGNIFICANT CHANGE UP (ref 0.7–1.5)
DIFF PNL FLD: ABNORMAL
EOSINOPHIL # BLD AUTO: 0.04 K/UL — SIGNIFICANT CHANGE UP (ref 0–0.7)
EOSINOPHIL NFR BLD AUTO: 0.5 % — SIGNIFICANT CHANGE UP (ref 0–8)
EPI CELLS # UR: ABNORMAL /HPF
GLUCOSE SERPL-MCNC: 104 MG/DL — HIGH (ref 70–99)
GLUCOSE UR QL: NEGATIVE MG/DL — SIGNIFICANT CHANGE UP
HCT VFR BLD CALC: 44 % — SIGNIFICANT CHANGE UP (ref 37–47)
HGB BLD-MCNC: 15.3 G/DL — SIGNIFICANT CHANGE UP (ref 12–16)
IMM GRANULOCYTES NFR BLD AUTO: 0.5 % — HIGH (ref 0.1–0.3)
KETONES UR-MCNC: NEGATIVE — SIGNIFICANT CHANGE UP
LEUKOCYTE ESTERASE UR-ACNC: ABNORMAL
LYMPHOCYTES # BLD AUTO: 0.61 K/UL — LOW (ref 1.2–3.4)
LYMPHOCYTES # BLD AUTO: 7.4 % — LOW (ref 20.5–51.1)
MCHC RBC-ENTMCNC: 30.1 PG — SIGNIFICANT CHANGE UP (ref 27–31)
MCHC RBC-ENTMCNC: 34.8 G/DL — SIGNIFICANT CHANGE UP (ref 32–37)
MCV RBC AUTO: 86.6 FL — SIGNIFICANT CHANGE UP (ref 81–99)
MONOCYTES # BLD AUTO: 0.33 K/UL — SIGNIFICANT CHANGE UP (ref 0.1–0.6)
MONOCYTES NFR BLD AUTO: 4 % — SIGNIFICANT CHANGE UP (ref 1.7–9.3)
NEUTROPHILS # BLD AUTO: 7.24 K/UL — HIGH (ref 1.4–6.5)
NEUTROPHILS NFR BLD AUTO: 87.4 % — HIGH (ref 42.2–75.2)
NITRITE UR-MCNC: NEGATIVE — SIGNIFICANT CHANGE UP
NRBC # BLD: 0 /100 WBCS — SIGNIFICANT CHANGE UP (ref 0–0)
PH UR: 6.5 — SIGNIFICANT CHANGE UP (ref 5–8)
PLATELET # BLD AUTO: 242 K/UL — SIGNIFICANT CHANGE UP (ref 130–400)
POTASSIUM SERPL-MCNC: 4.3 MMOL/L — SIGNIFICANT CHANGE UP (ref 3.5–5)
POTASSIUM SERPL-SCNC: 4.3 MMOL/L — SIGNIFICANT CHANGE UP (ref 3.5–5)
PROT SERPL-MCNC: 7.3 G/DL — SIGNIFICANT CHANGE UP (ref 6–8)
PROT UR-MCNC: ABNORMAL MG/DL
RBC # BLD: 5.08 M/UL — SIGNIFICANT CHANGE UP (ref 4.2–5.4)
RBC # FLD: 12.3 % — SIGNIFICANT CHANGE UP (ref 11.5–14.5)
RBC CASTS # UR COMP ASSIST: SIGNIFICANT CHANGE UP /HPF
SODIUM SERPL-SCNC: 137 MMOL/L — SIGNIFICANT CHANGE UP (ref 135–146)
SP GR SPEC: 1.02 — SIGNIFICANT CHANGE UP (ref 1.01–1.03)
TROPONIN T SERPL-MCNC: <0.01 NG/ML — SIGNIFICANT CHANGE UP
UROBILINOGEN FLD QL: 0.2 MG/DL — SIGNIFICANT CHANGE UP (ref 0.2–0.2)
WBC # BLD: 8.28 K/UL — SIGNIFICANT CHANGE UP (ref 4.8–10.8)
WBC # FLD AUTO: 8.28 K/UL — SIGNIFICANT CHANGE UP (ref 4.8–10.8)
WBC UR QL: SIGNIFICANT CHANGE UP /HPF

## 2020-12-02 PROCEDURE — 70450 CT HEAD/BRAIN W/O DYE: CPT | Mod: 26

## 2020-12-02 PROCEDURE — 71046 X-RAY EXAM CHEST 2 VIEWS: CPT | Mod: 26

## 2020-12-02 PROCEDURE — 99285 EMERGENCY DEPT VISIT HI MDM: CPT

## 2020-12-02 RX ORDER — METOCLOPRAMIDE HCL 10 MG
10 TABLET ORAL ONCE
Refills: 0 | Status: COMPLETED | OUTPATIENT
Start: 2020-12-02 | End: 2020-12-02

## 2020-12-02 RX ORDER — SODIUM CHLORIDE 9 MG/ML
1000 INJECTION INTRAMUSCULAR; INTRAVENOUS; SUBCUTANEOUS ONCE
Refills: 0 | Status: COMPLETED | OUTPATIENT
Start: 2020-12-02 | End: 2020-12-02

## 2020-12-02 RX ORDER — NITROFURANTOIN MACROCRYSTAL 50 MG
1 CAPSULE ORAL
Qty: 10 | Refills: 0
Start: 2020-12-02 | End: 2020-12-06

## 2020-12-02 RX ORDER — ACETAMINOPHEN 500 MG
650 TABLET ORAL ONCE
Refills: 0 | Status: COMPLETED | OUTPATIENT
Start: 2020-12-02 | End: 2020-12-02

## 2020-12-02 RX ADMIN — SODIUM CHLORIDE 1000 MILLILITER(S): 9 INJECTION INTRAMUSCULAR; INTRAVENOUS; SUBCUTANEOUS at 20:40

## 2020-12-02 RX ADMIN — Medication 10 MILLIGRAM(S): at 20:38

## 2020-12-02 RX ADMIN — Medication 650 MILLIGRAM(S): at 20:38

## 2020-12-02 NOTE — ED PROVIDER NOTE - PATIENT PORTAL LINK FT
You can access the FollowMyHealth Patient Portal offered by [facility] by registering at the following website: [email or web address]. By joining [facility]’s FollowMyHealth portal, you will also be able to view your health information using other applications (apps) compatible with our system.

## 2020-12-02 NOTE — ED PROVIDER NOTE - PHYSICAL EXAMINATION
Vital Signs: I have reviewed the initial vital signs.  Constitutional: well-nourished, appears stated age, no acute distress.  HEENT: Airway patent, protected.   CV: regular rate, regular rhythm, well-perfused extremities, 2+ b/l DP and radial pulses equal.  Lungs: BCTA, no increased WOB.  ABD: soft, NTND, no guarding or rebound, no pulsatile mass, no hernias.   MSK: Neck supple, nontender, nl ROM, no stepoff. Chest ttp. Back nontender in flanks. Ext nontender, nl rom, no deformity.   INTEG: Skin warm, dry, no rash.  NEURO: A&Ox3, moving all extremities, normal speech  PSYCH: Calm, cooperative, normal affect and interaction.

## 2020-12-02 NOTE — ED ADULT TRIAGE NOTE - CHIEF COMPLAINT QUOTE
sent by urgent care, c/o headache, fatigue, generalized weakness, as per patient, she was +UTI in urgent care

## 2020-12-02 NOTE — ED PROVIDER NOTE - OBJECTIVE STATEMENT
40F no pmhx presents with headache/syncope. Patient ntoes that she has been having headache for 2 weeks, states it is gradual, associated with some light-headedness. patient denies fever/chills/nausea/vomiting, went to urgent care today, felt hot/sweaty while waiting in line and passed out for a few seconds, denies any head injury/tongue biting, endorses some mild fecal incontinence, no observed myclonic jerking, no hx of seizures, denies confusino afterwards. Patient states she was told she had a "uti" at the urgent care, sent to ED. Endorses mild burning epigastric pain radiating to the chest, no shortness of breath/diarrhea.

## 2020-12-02 NOTE — ED PROVIDER NOTE - CLINICAL SUMMARY MEDICAL DECISION MAKING FREE TEXT BOX
40yF  pw 2 weeks of  fatigue,  headache  , unilateral  intermittent  changes from right side,  to left side    , gradual onset, did not occur on exertion,  no LOC no neck pain ,  no fever 6/10 in intensity,  today felt  weak and lightheaded went to [facility]  , while waiting on line  had presyncopal episode -  no preceding symptoms other than  lightheaded diaphoretic, no thunderclap headache no  chest pain no sob abdominal pain.    in [facility] had normal ekg cxr,  dxd + uti   sent to ED  Alert well appearing perrl no photophobia no papilledema on fundoscopy, cvs rrr resp cta abd soft nontender neck supple no meningismus, neg brudzinski, neg kernigs, skin no rash no purpura or petechia.   ekg wnl  labs reviewed  wnl,  + uti, CT head no acute findings,  dw patient unlikely SAH  and  LP not indicated   pt feels betetr after IVF  Patient to be discharged from ED. Any available test results were discussed with and printed  for patient.  Verbal instructions given, including instructions to return to ED immediately for any new, worsening, or concerning symptoms. Limitations of ED work up discussed.  Patient reports understanding of above with capacity and insight. Written discharge instructions additionally given, including follow-up plan.

## 2020-12-02 NOTE — ED PROVIDER NOTE - NSFOLLOWUPINSTRUCTIONS_ED_ALL_ED_FT
Urinary Tract Infection, Adult  A urinary tract infection (UTI) is an infection of any part of the urinary tract, which includes the kidneys, ureters, bladder, and urethra. These organs make, store, and get rid of urine in the body. UTI can be a bladder infection (cystitis) or kidney infection (pyelonephritis).    What are the causes?  This infection may be caused by fungi, viruses, or bacteria. Bacteria are the most common cause of UTIs. This condition can also be caused by repeated incomplete emptying of the bladder during urination.    What increases the risk?  This condition is more likely to develop if:    You ignore your need to urinate or hold urine for long periods of time.  You do not empty your bladder completely during urination.  You wipe back to front after urinating or having a bowel movement, if you are female.  You are uncircumcised, if you are male.  You are constipated.  You have a urinary catheter that stays in place (indwelling).  You have a weak defense (immune) system.  You have a medical condition that affects your bowels, kidneys, or bladder.  You have diabetes.  You take antibiotic medicines frequently or for long periods of time, and the antibiotics no longer work well against certain types of infections (antibiotic resistance).  You take medicines that irritate your urinary tract.  You are exposed to chemicals that irritate your urinary tract.  You are female.    What are the signs or symptoms?  Symptoms of this condition include:    Fever.  Frequent urination or passing small amounts of urine frequently.  Needing to urinate urgently.  Pain or burning with urination.  Urine that smells bad or unusual.  Cloudy urine.  Pain in the lower abdomen or back.  Trouble urinating.  Blood in the urine.  Vomiting or being less hungry than normal.  Diarrhea or abdominal pain.  Vaginal discharge, if you are female.    How is this diagnosed?  This condition is diagnosed with a medical history and physical exam. You will also need to provide a urine sample to test your urine. Other tests may be done, including:    Blood tests.  Sexually transmitted disease (STD) testing.    If you have had more than one UTI, a cystoscopy or imaging studies may be done to determine the cause of the infections.    How is this treated?  Treatment for this condition often includes a combination of two or more of the following:    Antibiotic medicine.  Other medicines to treat less common causes of UTI.  Over-the-counter medicines to treat pain.  Drinking enough water to stay hydrated.    Follow these instructions at home:  Take over-the-counter and prescription medicines only as told by your health care provider.  If you were prescribed an antibiotic, take it as told by your health care provider. Do not stop taking the antibiotic even if you start to feel better.  Avoid alcohol, caffeine, tea, and carbonated beverages. They can irritate your bladder.  Drink enough fluid to keep your urine clear or pale yellow.  Keep all follow-up visits as told by your health care provider. This is important.  ImageMake sure to:    Empty your bladder often and completely. Do not hold urine for long periods of time.  Empty your bladder before and after sex.  Wipe from front to back after a bowel movement if you are female. Use each tissue one time when you wipe.    Contact a health care provider if:  You have back pain.  You have a fever.  You feel nauseous or vomit.  Your symptoms do not get better after 3 days.  Your symptoms go away and then return.  Get help right away if:  You have severe back pain or lower abdominal pain.  You are vomiting and cannot keep down any medicines or water.  This information is not intended to replace advice given to you by your health care provider. Make sure you discuss any questions you have with your health care provider.      Headache    A headache is pain or discomfort felt around the head or neck area. The specific cause of a headache may not be found as there are many types including tension headaches, migraine headaches, and cluster headaches. Watch your condition for any changes. Things you can do to manage your pain include taking over the counter and prescription medications as instructed by your health care provider, lying down in a dark quiet room, limiting stress, getting regular sleep, and refraining from alcohol and tobacco products.    SEEK IMMEDIATE MEDICAL CARE IF YOU HAVE ANY OF THE FOLLOWING SYMPTOMS: fever, vomiting, stiff neck, loss of vision, problems with speech, muscle weakness, loss of balance, trouble walking, passing out, or confusion.

## 2020-12-02 NOTE — ED ADULT NURSE NOTE - NSIMPLEMENTINTERV_GEN_ALL_ED
Implemented All Universal Safety Interventions:  [place] to call system. Call bell, personal items and telephone within reach. Instruct patient to call for assistance. Room bathroom lighting operational. Non-slip footwear when patient is off stretcher. Physically safe environment: no spills, clutter or unnecessary equipment. Stretcher in lowest position, wheels locked, appropriate side rails in place.

## 2020-12-04 LAB
CULTURE RESULTS: SIGNIFICANT CHANGE UP
SPECIMEN SOURCE: SIGNIFICANT CHANGE UP

## 2020-12-05 ENCOUNTER — EMERGENCY (EMERGENCY)
Facility: HOSPITAL | Age: 40
LOS: 0 days | Discharge: HOME | End: 2020-12-05
Attending: STUDENT IN AN ORGANIZED HEALTH CARE EDUCATION/TRAINING PROGRAM | Admitting: STUDENT IN AN ORGANIZED HEALTH CARE EDUCATION/TRAINING PROGRAM
Payer: COMMERCIAL

## 2020-12-05 VITALS
DIASTOLIC BLOOD PRESSURE: 78 MMHG | SYSTOLIC BLOOD PRESSURE: 135 MMHG | TEMPERATURE: 99 F | RESPIRATION RATE: 19 BRPM | HEART RATE: 81 BPM | OXYGEN SATURATION: 97 %

## 2020-12-05 DIAGNOSIS — Z79.899 OTHER LONG TERM (CURRENT) DRUG THERAPY: ICD-10-CM

## 2020-12-05 DIAGNOSIS — Z88.0 ALLERGY STATUS TO PENICILLIN: ICD-10-CM

## 2020-12-05 DIAGNOSIS — U07.1 COVID-19: ICD-10-CM

## 2020-12-05 DIAGNOSIS — R07.89 OTHER CHEST PAIN: ICD-10-CM

## 2020-12-05 LAB
ALBUMIN SERPL ELPH-MCNC: 4.3 G/DL — SIGNIFICANT CHANGE UP (ref 3.5–5.2)
ALP SERPL-CCNC: 52 U/L — SIGNIFICANT CHANGE UP (ref 30–115)
ALT FLD-CCNC: 33 U/L — SIGNIFICANT CHANGE UP (ref 0–41)
ANION GAP SERPL CALC-SCNC: 10 MMOL/L — SIGNIFICANT CHANGE UP (ref 7–14)
AST SERPL-CCNC: 25 U/L — SIGNIFICANT CHANGE UP (ref 0–41)
BASOPHILS # BLD AUTO: 0.02 K/UL — SIGNIFICANT CHANGE UP (ref 0–0.2)
BASOPHILS NFR BLD AUTO: 0.4 % — SIGNIFICANT CHANGE UP (ref 0–1)
BILIRUB SERPL-MCNC: 0.4 MG/DL — SIGNIFICANT CHANGE UP (ref 0.2–1.2)
BUN SERPL-MCNC: 13 MG/DL — SIGNIFICANT CHANGE UP (ref 10–20)
CALCIUM SERPL-MCNC: 9.6 MG/DL — SIGNIFICANT CHANGE UP (ref 8.5–10.1)
CHLORIDE SERPL-SCNC: 100 MMOL/L — SIGNIFICANT CHANGE UP (ref 98–110)
CO2 SERPL-SCNC: 28 MMOL/L — SIGNIFICANT CHANGE UP (ref 17–32)
CREAT SERPL-MCNC: 0.9 MG/DL — SIGNIFICANT CHANGE UP (ref 0.7–1.5)
EOSINOPHIL # BLD AUTO: 0.08 K/UL — SIGNIFICANT CHANGE UP (ref 0–0.7)
EOSINOPHIL NFR BLD AUTO: 1.7 % — SIGNIFICANT CHANGE UP (ref 0–8)
GLUCOSE SERPL-MCNC: 85 MG/DL — SIGNIFICANT CHANGE UP (ref 70–99)
HCT VFR BLD CALC: 41.8 % — SIGNIFICANT CHANGE UP (ref 37–47)
HGB BLD-MCNC: 14.5 G/DL — SIGNIFICANT CHANGE UP (ref 12–16)
IMM GRANULOCYTES NFR BLD AUTO: 0.4 % — HIGH (ref 0.1–0.3)
LYMPHOCYTES # BLD AUTO: 1.55 K/UL — SIGNIFICANT CHANGE UP (ref 1.2–3.4)
LYMPHOCYTES # BLD AUTO: 32.4 % — SIGNIFICANT CHANGE UP (ref 20.5–51.1)
MCHC RBC-ENTMCNC: 29.9 PG — SIGNIFICANT CHANGE UP (ref 27–31)
MCHC RBC-ENTMCNC: 34.7 G/DL — SIGNIFICANT CHANGE UP (ref 32–37)
MCV RBC AUTO: 86.2 FL — SIGNIFICANT CHANGE UP (ref 81–99)
MONOCYTES # BLD AUTO: 0.35 K/UL — SIGNIFICANT CHANGE UP (ref 0.1–0.6)
MONOCYTES NFR BLD AUTO: 7.3 % — SIGNIFICANT CHANGE UP (ref 1.7–9.3)
NEUTROPHILS # BLD AUTO: 2.77 K/UL — SIGNIFICANT CHANGE UP (ref 1.4–6.5)
NEUTROPHILS NFR BLD AUTO: 57.8 % — SIGNIFICANT CHANGE UP (ref 42.2–75.2)
NRBC # BLD: 0 /100 WBCS — SIGNIFICANT CHANGE UP (ref 0–0)
PLATELET # BLD AUTO: 221 K/UL — SIGNIFICANT CHANGE UP (ref 130–400)
POTASSIUM SERPL-MCNC: 4.2 MMOL/L — SIGNIFICANT CHANGE UP (ref 3.5–5)
POTASSIUM SERPL-SCNC: 4.2 MMOL/L — SIGNIFICANT CHANGE UP (ref 3.5–5)
PROT SERPL-MCNC: 7.2 G/DL — SIGNIFICANT CHANGE UP (ref 6–8)
RBC # BLD: 4.85 M/UL — SIGNIFICANT CHANGE UP (ref 4.2–5.4)
RBC # FLD: 12.2 % — SIGNIFICANT CHANGE UP (ref 11.5–14.5)
SODIUM SERPL-SCNC: 138 MMOL/L — SIGNIFICANT CHANGE UP (ref 135–146)
TROPONIN T SERPL-MCNC: <0.01 NG/ML — SIGNIFICANT CHANGE UP
WBC # BLD: 4.79 K/UL — LOW (ref 4.8–10.8)
WBC # FLD AUTO: 4.79 K/UL — LOW (ref 4.8–10.8)

## 2020-12-05 PROCEDURE — 93010 ELECTROCARDIOGRAM REPORT: CPT

## 2020-12-05 PROCEDURE — 71045 X-RAY EXAM CHEST 1 VIEW: CPT | Mod: 26

## 2020-12-05 PROCEDURE — 99285 EMERGENCY DEPT VISIT HI MDM: CPT

## 2020-12-05 NOTE — ED PROVIDER NOTE - ATTENDING CONTRIBUTION TO CARE
39 yo F no pmh pw body aches. Recent +covid test about 3 days ago. C/o body aches, fever, chills, cough, sob, fatigue for the past 4-5 days. +chest tightness after coughing. No n/v, no diarrhea, no rash, no cp, no palpitations, no urinary sx.     CONSTITUTIONAL: Well-developed; well-nourished; in no acute distress. Sitting up and providing appropriate history and physical examination  SKIN: skin exam is warm and dry, no acute rash.  HEAD: Normocephalic; atraumatic.  EYES: PERRL, 3 mm bilateral, no nystagmus, EOM intact; conjunctiva and sclera clear.  ENT: No nasal discharge; airway clear.  NECK: Supple; non tender. + full passive ROM in all directions. No JVD  CARD: S1, S2 normal; no murmurs, gallops, or rubs. Regular rate and rhythm. + Symmetric Strong Pulses  RESP: No wheezes, rales or rhonchi. Good air movement bilaterally  ABD: soft; non-distended; non-tender. No Rebound, No Guarding, No signs of peritonitis, No CVA tenderness. No pulsatile abdominal mass. + Strong and Symmetric Pulses  EXT: Normal ROM. No clubbing, cyanosis or edema. Dp and Pt Pulses intact. Cap refill less than 3 seconds  NEURO: CN 2-12 intact, normal finger to nose, normal romberg, stable gait, no sensory or motor deficits, Alert, oriented, grossly unremarkable. No Focal deficits. GCS 15. NIH 0  PSYCH: Cooperative, appropriate.

## 2020-12-05 NOTE — ED PROVIDER NOTE - NS ED ROS FT
Constitutional: (+) fever  Eyes/ENT: (-) visual changes   Cardiovascular: (+) chest pain, (-) syncope  Respiratory: (-) cough, (-) shortness of breath  Gastrointestinal: (-) vomiting, (-) diarrhea  Genitourinary: (-) dysuria, (-) hesitancy, (-) frequency   Musculoskeletal: (-) neck pain, (-) back pain, (-) joint pain  Integumentary: (-) rash, (-) edema  Neurological: (-) headache, (-) altered mental status  Allergic/Immunologic: (-) pruritus

## 2020-12-05 NOTE — ED PROVIDER NOTE - OBJECTIVE STATEMENT
39 yo female with no pertinent pmh presents c/o chest pressure. pt tested covid+ and yesterday noted chest tightness after coughing. pt has experienced fever [id number]/ [id number]. denies any other symptoms including headache, recent travel, cough, abdominal pain, chest pain, or SOB.

## 2020-12-05 NOTE — ED PROVIDER NOTE - CLINICAL SUMMARY MEDICAL DECISION MAKING FREE TEXT BOX
I personally evaluated the patient. I reviewed the Resident’s or Physician Assistant’s note (as assigned above), and agree with the findings and plan except as documented in my note. Patient evaluated for covid19, chest pain. Labs, ekg, cxr performed in ED. Patient feeling improved, vs stable. Given isolation precautions. I have fully discussed the medical management and delivery of care with the patient. I have discussed any available labs, imaging and treatment options with the patient. Patient confirms understanding and has been given detailed return precautions. Patient instructed to return to the ED should symptoms persist or worsen. Patient has demonstrated capacity and has verbalized understanding. Patient is well appearing upon discharge.

## 2020-12-05 NOTE — ED PROVIDER NOTE - PATIENT PORTAL LINK FT
You can access the FollowMyHealth Patient Portal offered by [facility] by registering at the following website: [email or web address]. By joining [employer]’s FollowMyHealth portal, you will also be able to view your health information using other applications (apps) compatible with our system.

## 2020-12-07 ENCOUNTER — INPATIENT (INPATIENT)
Facility: HOSPITAL | Age: 40
LOS: 1 days | Discharge: HOME | End: 2020-12-09
Attending: INTERNAL MEDICINE | Admitting: INTERNAL MEDICINE
Payer: COMMERCIAL

## 2020-12-07 VITALS
HEART RATE: 100 BPM | DIASTOLIC BLOOD PRESSURE: 81 MMHG | SYSTOLIC BLOOD PRESSURE: 124 MMHG | TEMPERATURE: 98 F | OXYGEN SATURATION: 100 % | WEIGHT: 197.98 LBS | HEIGHT: 62 IN | RESPIRATION RATE: 18 BRPM

## 2020-12-07 LAB
ALBUMIN SERPL ELPH-MCNC: 4.5 G/DL — SIGNIFICANT CHANGE UP (ref 3.5–5.2)
ALP SERPL-CCNC: 52 U/L — SIGNIFICANT CHANGE UP (ref 30–115)
ALT FLD-CCNC: 25 U/L — SIGNIFICANT CHANGE UP (ref 0–41)
ANION GAP SERPL CALC-SCNC: 12 MMOL/L — SIGNIFICANT CHANGE UP (ref 7–14)
APTT BLD: 30.4 SEC — SIGNIFICANT CHANGE UP (ref 27–39.2)
AST SERPL-CCNC: 19 U/L — SIGNIFICANT CHANGE UP (ref 0–41)
BASOPHILS # BLD AUTO: 0.04 K/UL — SIGNIFICANT CHANGE UP (ref 0–0.2)
BASOPHILS NFR BLD AUTO: 0.5 % — SIGNIFICANT CHANGE UP (ref 0–1)
BILIRUB SERPL-MCNC: 0.3 MG/DL — SIGNIFICANT CHANGE UP (ref 0.2–1.2)
BLD GP AB SCN SERPL QL: SIGNIFICANT CHANGE UP
BUN SERPL-MCNC: 11 MG/DL — SIGNIFICANT CHANGE UP (ref 10–20)
CALCIUM SERPL-MCNC: 10 MG/DL — SIGNIFICANT CHANGE UP (ref 8.5–10.1)
CHLORIDE SERPL-SCNC: 101 MMOL/L — SIGNIFICANT CHANGE UP (ref 98–110)
CO2 SERPL-SCNC: 27 MMOL/L — SIGNIFICANT CHANGE UP (ref 17–32)
CREAT SERPL-MCNC: 1 MG/DL — SIGNIFICANT CHANGE UP (ref 0.7–1.5)
EOSINOPHIL # BLD AUTO: 0.14 K/UL — SIGNIFICANT CHANGE UP (ref 0–0.7)
EOSINOPHIL NFR BLD AUTO: 1.6 % — SIGNIFICANT CHANGE UP (ref 0–8)
ETHANOL SERPL-MCNC: <10 MG/DL — SIGNIFICANT CHANGE UP
GLUCOSE SERPL-MCNC: 112 MG/DL — HIGH (ref 70–99)
HCG SERPL QL: NEGATIVE — SIGNIFICANT CHANGE UP
HCT VFR BLD CALC: 42.7 % — SIGNIFICANT CHANGE UP (ref 37–47)
HGB BLD-MCNC: 15.1 G/DL — SIGNIFICANT CHANGE UP (ref 12–16)
IMM GRANULOCYTES NFR BLD AUTO: 0.2 % — SIGNIFICANT CHANGE UP (ref 0.1–0.3)
INR BLD: 1.09 RATIO — SIGNIFICANT CHANGE UP (ref 0.65–1.3)
LACTATE SERPL-SCNC: 1.8 MMOL/L — SIGNIFICANT CHANGE UP (ref 0.7–2)
LYMPHOCYTES # BLD AUTO: 3.32 K/UL — SIGNIFICANT CHANGE UP (ref 1.2–3.4)
LYMPHOCYTES # BLD AUTO: 38.8 % — SIGNIFICANT CHANGE UP (ref 20.5–51.1)
MCHC RBC-ENTMCNC: 30.3 PG — SIGNIFICANT CHANGE UP (ref 27–31)
MCHC RBC-ENTMCNC: 35.4 G/DL — SIGNIFICANT CHANGE UP (ref 32–37)
MCV RBC AUTO: 85.7 FL — SIGNIFICANT CHANGE UP (ref 81–99)
MONOCYTES # BLD AUTO: 0.57 K/UL — SIGNIFICANT CHANGE UP (ref 0.1–0.6)
MONOCYTES NFR BLD AUTO: 6.7 % — SIGNIFICANT CHANGE UP (ref 1.7–9.3)
NEUTROPHILS # BLD AUTO: 4.47 K/UL — SIGNIFICANT CHANGE UP (ref 1.4–6.5)
NEUTROPHILS NFR BLD AUTO: 52.2 % — SIGNIFICANT CHANGE UP (ref 42.2–75.2)
NRBC # BLD: 0 /100 WBCS — SIGNIFICANT CHANGE UP (ref 0–0)
PLATELET # BLD AUTO: 284 K/UL — SIGNIFICANT CHANGE UP (ref 130–400)
POTASSIUM SERPL-MCNC: 3.6 MMOL/L — SIGNIFICANT CHANGE UP (ref 3.5–5)
POTASSIUM SERPL-SCNC: 3.6 MMOL/L — SIGNIFICANT CHANGE UP (ref 3.5–5)
PROT SERPL-MCNC: 7 G/DL — SIGNIFICANT CHANGE UP (ref 6–8)
PROTHROM AB SERPL-ACNC: 12.5 SEC — SIGNIFICANT CHANGE UP (ref 9.95–12.87)
RBC # BLD: 4.98 M/UL — SIGNIFICANT CHANGE UP (ref 4.2–5.4)
RBC # FLD: 12.1 % — SIGNIFICANT CHANGE UP (ref 11.5–14.5)
SODIUM SERPL-SCNC: 140 MMOL/L — SIGNIFICANT CHANGE UP (ref 135–146)
TROPONIN T SERPL-MCNC: <0.01 NG/ML — SIGNIFICANT CHANGE UP
WBC # BLD: 8.56 K/UL — SIGNIFICANT CHANGE UP (ref 4.8–10.8)
WBC # FLD AUTO: 8.56 K/UL — SIGNIFICANT CHANGE UP (ref 4.8–10.8)

## 2020-12-07 PROCEDURE — 99285 EMERGENCY DEPT VISIT HI MDM: CPT

## 2020-12-07 PROCEDURE — 70450 CT HEAD/BRAIN W/O DYE: CPT | Mod: 26

## 2020-12-07 PROCEDURE — 71045 X-RAY EXAM CHEST 1 VIEW: CPT | Mod: 26

## 2020-12-07 PROCEDURE — 93010 ELECTROCARDIOGRAM REPORT: CPT

## 2020-12-07 RX ORDER — CHLORHEXIDINE GLUCONATE 213 G/1000ML
1 SOLUTION TOPICAL
Refills: 0 | Status: DISCONTINUED | OUTPATIENT
Start: 2020-12-07 | End: 2020-12-09

## 2020-12-07 RX ORDER — ENOXAPARIN SODIUM 100 MG/ML
40 INJECTION SUBCUTANEOUS DAILY
Refills: 0 | Status: DISCONTINUED | OUTPATIENT
Start: 2020-12-07 | End: 2020-12-09

## 2020-12-07 RX ORDER — ATORVASTATIN CALCIUM 80 MG/1
40 TABLET, FILM COATED ORAL ONCE
Refills: 0 | Status: COMPLETED | OUTPATIENT
Start: 2020-12-07 | End: 2020-12-07

## 2020-12-07 RX ORDER — ASPIRIN/CALCIUM CARB/MAGNESIUM 324 MG
325 TABLET ORAL ONCE
Refills: 0 | Status: COMPLETED | OUTPATIENT
Start: 2020-12-07 | End: 2020-12-07

## 2020-12-07 RX ADMIN — ATORVASTATIN CALCIUM 40 MILLIGRAM(S): 80 TABLET, FILM COATED ORAL at 22:43

## 2020-12-07 RX ADMIN — Medication 325 MILLIGRAM(S): at 22:43

## 2020-12-07 NOTE — ED PROVIDER NOTE - OBJECTIVE STATEMENT
40F with pmh of Hep B controlled w/o medications presents with R facial numbness beginning at 1PM. PT ambulatory. PT denies weakness, slurring of speech, blurry vision, falls, CP, SOB, n/v/d. Denies personal or fhx of CVA, nonsmoker.

## 2020-12-07 NOTE — ED ADULT TRIAGE NOTE - CHIEF COMPLAINT QUOTE
tested + covid on wednesday, - test on Friday.  1330 today feeling numbness in and pins and needles to right side of face, fingers and leg. aw shooting pain to back of head.

## 2020-12-07 NOTE — H&P ADULT - ATTENDING COMMENTS
Patient seen and examined independently. Agree with resident note/ history / physical exam and plan of care with following exceptions/additions/updates. Case discussed with patient/pt decision maker, house-staff and nursing.     pt is still feeling numbness on the right side of face and arm. no symptoms on lower ext.   awaiting MRI   follow up neurology

## 2020-12-07 NOTE — H&P ADULT - NSHPREVIEWOFSYSTEMS_GEN_ALL_CORE
CONSTITUTIONAL: No weakness, fevers or chills, no weight loss   EYES/ENT: No visual changes;  No vertigo or throat pain   NECK: No pain or stiffness  RESPIRATORY: No cough, wheezing, hemoptysis; No shortness of breath  CARDIOVASCULAR: No chest pain or palpitations  GASTROINTESTINAL: No abdominal or epigastric pain. No nausea, vomiting, or hematemesis; No diarrhea or constipation. No melena or hematochezia.  GENITOURINARY: No dysuria, frequency or hematuria  NEUROLOGICAL: headaches and right sides face, arm, leg numbness  All other review of systems is negative unless indicated above.

## 2020-12-07 NOTE — ED PROVIDER NOTE - ATTENDING CONTRIBUTION TO CARE
I personally evaluated patient. I agree with the findings and plan with all documentation on chart except as documented  in my note.    36 yo F PMH Hep B who presents to the ED with right face, arm, and leg numbness since 1 or 1:30 pm. Stroke Code called upon arrival given symptoms. She denies focal weakness, aphasia, dysarthria, visual change, or other focal neurologic deficit. No fever or signs of any infectious etiology. Patient has no recent trauma. Patient on OCP's and reports having a headache for the past 3 weeks.    On exam, VS reviewed. Patient examined at bedside and assessed by Neurology. I personally evaluated patient. I agree with the findings and plan with all documentation on chart except as documented  in my note.    38 yo F PMH Hep B who presents to the ED with right face, arm, and leg numbness since 1 or 1:30 pm. Stroke Code called upon arrival given symptoms. She denies focal weakness, aphasia, dysarthria, visual change, or other focal neurologic deficit. No fever or signs of any infectious etiology. Patient has no recent trauma. Patient on OCP's and reports having a headache for the past 3 weeks.    On exam, VS reviewed. Patient examined at bedside and assessed by Neurology. Initial NIH score 2 for sensation on the left side. Patient has no motor deficits. She is outside of t-PA window given onset of symptoms. Emergent head CT done. Patient is not an interventional candidate given mRS score.    ED work up reviewed. Patient has no signs of stroke on CT scan.  Will admit to Stroke unit for further work up and management, MRI/MRV as an inpatient.  Repeat Neuro exam unchanged in the ED but patient is ambulatory to bathroom with assistance.

## 2020-12-07 NOTE — H&P ADULT - HISTORY OF PRESENT ILLNESS
39 yo F with a pmhx hepatitis B who presents to the ED with right face, arm, and leg numbness on 12/7 since 1 or 1:30 pm. She denies focal weakness, aphasia, dysarthria, visual change, or other focal neurologic deficit. She does complain of three weeks of new onset, daily headache which she attributed to her new oral contraceptive (OCP) regimen. She has headaches rarely, and she treats them with Advil without issue. These headaches are thus something she has never experienced before and she states they are bifrontal, throbbing, moderately-severe, but without thunderclap at onset; scintillations, unilateral tearing, sweating, redness, and neck pain are denied. Numbness had not been an issue prior to today. Her OB/GYN started her on OCP due to heavy menstrual bleeding about two months ago and the patient decided to stop the medication about one week ago due to the headaches.    In the ED, Vitals were WNL, CT head did not show any intracranial pathology. 41 yo F with a pmhx hepatitis B, and  COVID + ( 12/2 but currently negative) who presents to the ED with right face, arm, and leg numbness on 12/7 since 1 or 1:30 pm. She denies focal weakness, aphasia, dysarthria, visual change, or other focal neurologic deficit. Of note patient does complain of three weeks of new onset, daily headache which she attributed to her new oral contraceptive (OCP) regimen. Her OB/GYN started her on OCP due to heavy menstrual bleeding about two months ago and the patient decided to stop the medication about one week ago due to the headaches. The headaches did not improve with cessation of OCP and states that it is not relieved by OTC analgesics  These headaches are thus something she has never experienced before and she states they are bifrontal, throbbing, moderately-severe, and debilitating lasting over thirty minutes. She denies thunderclap at onset; scintillations, unilateral tearing, sweating, redness, and neck pain are denied. Numbness had not been an issue prior to today.     In the ED, Vitals were WNL, CT head did not show any intracranial pathology. 41 yo F with a pmhx hepatitis B, and  COVID + ( 12/2 but currently negative) who presents to the ED with right face, arm, and leg numbness on 12/7 since 1 or 1:30 pm. She denies focal weakness, aphasia, dysarthria, visual change, or other focal neurologic deficit. Of note patient does complain of three weeks of new onset, daily headache which she attributed to her new oral contraceptive (OCP) regimen. Her OB/GYN started her on OCP due to heavy menstrual bleeding about two months ago and the patient decided to stop the medication about one week ago due to the headaches. The headaches did not improve with cessation of OCP and states that it is not relieved by OTC analgesics  These headaches are thus something she has never experienced before and she states they are bifrontal, throbbing, moderately-severe, and debilitating lasting over thirty minutes. She denies thunderclap at onset; scintillations, unilateral tearing, sweating, redness, and neck pain are denied. Numbness had not been an issue prior to today. No family/personal hx of migraines.     In the ED, Vitals were WNL, STROKE CODE was called. Neuro saw pt, initial NIHSS 2.   CT head & CTA did not show any intracranial pathology.   Gave her , atorva 40.

## 2020-12-07 NOTE — CONSULT NOTE ADULT - SUBJECTIVE AND OBJECTIVE BOX
Vascular Neurology Consult Note:    DELISA BAPTISTE    1. Chief Complaint: Right F/A/L numbness    HPI:    This is a 36 yo F who presents to the ED with right face, arm, and leg numbness since 1 or 1:30 pm. She denies focal weakness, aphasia, dysarthria, visual change, or other focal neurologic deficit. She does complain of three weeks of new onset, daily headache which she attributed to her new oral contraceptive (OCP) regimen. She has headaches rarely, and she treats them with Advil without issue. These headaches are thus something she has never experienced before and she states they are bifrontal, throbbing, moderately-severe, but without thunderclap at onset; scintillations, unilateral tearing, sweating, redness, and neck pain are denied. Numbness had not been an issue prior to today. Her OB/GYN started her on OCP due to heavy menstrual bleeding about two months ago and the patient decided to stop the medication about one week ago due to the headaches.    2. Relevant PMH: PCOS, dysfunctional uterine bleeding  Prior ischemic stroke/TIA[ ], Afib [ ], CAD [ ], HTN [ ], DLD [ ], DM [ ], PVD [ ], Obesity [x],   Sedentary lifestyle [ ], CHF [ ], EUN [ ], Cancer Hx [ ].    3. Social History: Smoking [ ], Drug Use [ ], Alcohol Use [ ], Other [ ]    4. Possible Location of Stroke:    5. Relevant Brain Tissue Imaging:  < from: CT Head No Cont (20 @ 20:16) >  Findings:    The ventricular system, basal cisterns and cortical sulcal pattern are within normal limits for the patient's stated age.    There is no acute intracranial hemorrhage, mass-effect or midline shift.    There is no extra-axial collection.    The visualized paranasal sinuses and mastoid complexes are grossly unremarkable.  There is no displaced skull fracture.      Impression:    1. No evidence of acute intracranial hemorrhage or mass effect.    < end of copied text >      6. Relevant Cerebrovascular Imagin. Relevant blood tests:      8. Relevant cardiac rhythm monitoring: NSR    9. Relevant Cardiac Structure: (TTE/ART +/-):[ ]No intracardiac thrombus/[ ] no vegetation/[ ]no [place]ia/EF:    Home Medications:      MEDICATIONS  (STANDING):      10. PT/OT/Speech/Rehab/S&Sw/ Cognitive eval results and recommendations:    11. Neurologic Examination:  Mentation: Awake, alert, oriented to person, place, and time. Anxious, tearful. Speech is clear and fluent. Follows complex, multiple part commands. No neglect.  Cranial Nerves:  	II – Visual fields full.  	III/IV/VI – Pupils 3 mm. PERRL. EOMI.  	V – Grossly intact sensation.  	VII – No facial palsy.  	VIII – No nystagmus.  	IX/X – Symmetric palate rise. Uvula midline.  	XI – SCM strong b/l.  	XII – Tongue protrusion midline.  Motor: 5/5 strength b/l. Normal bulk and tone.  Sensory: Decreased sensation to LT/PP in right F/A/L.  Reflexes: 2+ generally.  Cerebellum: No dysmetria. Gait deferred.    Vital Signs Last 24 Hrs  T(C): 36.9 (07 Dec 2020 20:16), Max: 36.9 (07 Dec 2020 20:16)  T(F): 98.5 (07 Dec 2020 20:16), Max: 98.5 (07 Dec 2020 20:16)  HR: 100 (07 Dec 2020 20:16) (100 - 100)  BP: 124/81 (07 Dec 2020 20:16) (124/81 - 124/81)  BP(mean): --  RR: 18 (07 Dec 2020 20:16) (18 - 18)  SpO2: 100% (07 Dec 2020 20:16) (100% - 100%)    12. NIH STROKE SCALE  Item	                                                        Score  1 a.	Level of Consciousness	               	0  1 b. LOC Questions	                                0  1 c.	LOC Commands	                               	0  2.	Best Gaze	                                        0  3.	Visual	                                                0  4.	Facial Palsy	                                        0  5 a.	Motor Arm - Left	                                0  5 b.	Motor Arm - Right	                        0  6 a.	Motor Leg - Left	                                0  6 b.	Motor Leg - Right	                                0  7.	Limb Ataxia	                                        0  8.	Sensory	                                                2  9.	Language	                                        0  10.	Dysarthria	                                        0  11.	Extinction and Inattention  	        0  ______________________________________  TOTAL	                                                        2    Total NIHSS on admission:   2   NIHSS yesterday:      NIHSS today:     mRS:  0 No symptoms at all  1 No significant disability despite symptoms; able to carry out all usual duties and activities without assistance  2 Slight disability; unable to carry out all previous activities, but able to look after own affairs  3 Moderate disability; requiring some help, but able to walk without assistance  4 Moderately severe disability; unable to walk without assistance and unable to attend to own bodily needs without assistance  5 Severe disability; bedridden, incontinent and requiring constant nursing care and attention  6 Dead      13. Impression: 36 yo F pre- who presents to the ED with R F/A/L numbness since 1-1:30 pm today.    Risks: PCOS, OCP tx, and obesity    Ddx includes, but is not limited to: ischemic stroke, complicated migraine, cerebral venous sinus thrombosis, hypercoagulable state, idiopathic intracranial hypertension (IIH aka pseudotumor cerebri)      14. Probable cause/s of Stroke: Unclear      15. Suggestions:   Routine stroke workup including:    - Aspirin 325 mg x 1 then 81 mg daily   - Lipitor 40 mg HS  - Q4 neuro checks  - Please obtain brain MRI and MRV if no contraindication  - Permissive HTN  - Check 2D echocardiogram  - Check lipid panel and A1c    16. Disposition: Stroke Unit

## 2020-12-07 NOTE — H&P ADULT - NSICDXFAMILYHX_GEN_ALL_CORE_FT
No pertinent family history in first degree relatives FAMILY HISTORY:  Family history of epilepsy in mother

## 2020-12-07 NOTE — H&P ADULT - ASSESSMENT
41 yo F with a pmhx hepatitis B who presents to the ED with right face, arm, and leg numbness on 12/7 since 1 or 1:30 pm.     #right sided numbness likely due to r/o ischemic stroke vs complicated migraine, vs idiopathic intracranial hypertension (IIH aka pseudotumor cerebri)    - Q4 neuro checks and  Permissive HTN  - continue Aspirin 325 mg x 1 then 81 mg daily   - continue Lipitor 40 mg HS  - FU  brain MRI and MRV   - FU 2D echocardiogram with bubble  - FU lipid panel and A1C  - FU further Neuro rec    ----------------------------------------------------------  # GI PPx - Not indicated  # DVT PPx -  Lovenox 40 mg SubQ    # Activity -  (X) Increase as Tolerated  # Dispo -   Patient to be discharged when medically optimized.  # Code Status - (X) FULL       39 yo F with a pmhx hepatitis B who presents to the ED with right face, arm, and leg numbness on 12/7 since 1 or 1:30 pm.     #right sided numbness - r/o ischemic stroke vs complicated migraine, vs idiopathic intracranial hypertension (IIH aka pseudotumor cerebri) vs less likely MS    - Q4 neuro checks and Permissive HTN  - continue Aspirin 325 mg x 1 then 81 mg daily   - continue Lipitor 40 mg HS  - FU  brain MRI and MRV   - FU 2D echocardiogram with bubble  - FU lipid panel and A1C  - FU further Neuro rec    ----------------------------------------------------------  # GI PPx - Not indicated  # DVT PPx -  Lovenox 40 mg SubQ    # Activity -  (X) Increase as Tolerated  # Dispo -   Patient to be discharged when medically optimized.  # Code Status - (X) FULL

## 2020-12-07 NOTE — ED PROVIDER NOTE - NS ED ROS FT
Constitutional: (-) diaphoresis  Eyes/ENT: (-) runny nose  Cardiovascular: (-) chest pain  Respiratory: (-) cough  Gastrointestinal: (-) vomiting, (-) diarrhea  : (-) dysuria   Musculoskeletal: (-) joint pain  Integumentary: (-) rash  Neurological: (-) LOC  Allergic/Immunologic: (-) pruritus

## 2020-12-07 NOTE — ED ADULT NURSE NOTE - NSIMPLEMENTINTERV_GEN_ALL_ED
Implemented All Universal Safety Interventions:  [place] to call system. Call bell, personal items and telephone within reach. Instruct patient to call for assistance. Room bathroom lighting operational. Non-slip footwear when patient is off stretcher. Physically safe environment: no spills, clutter or unnecessary equipment. Stretcher in lowest position, wheels locked, appropriate side rails in place.

## 2020-12-07 NOTE — ED PROVIDER NOTE - CLINICAL SUMMARY MEDICAL DECISION MAKING FREE TEXT BOX
36 yo F PMH Hep B who presents to the ED with right face, arm, and leg numbness since 1 or 1:30 pm. Stroke Code called upon arrival given symptoms. She denies focal weakness, aphasia, dysarthria, visual change, or other focal neurologic deficit. No fever or signs of any infectious etiology. Patient has no recent trauma. Patient on OCP's and reports having a headache for the past 3 weeks.    On exam, VS reviewed. Patient examined at bedside and assessed by Neurology. Initial NIH score 2 for sensation on the left side. Patient has no motor deficits. She is outside of t-PA window given onset of symptoms. Emergent head CT done. Patient is not an interventional candidate given mRS score.    ED work up reviewed. Patient has no signs of stroke on CT scan.  Will admit to Stroke unit for further work up and management, MRI/MRV as an inpatient.  Repeat Neuro exam unchanged in the ED but patient is ambulatory to bathroom with assistance.

## 2020-12-07 NOTE — ED ADULT NURSE NOTE - OBJECTIVE STATEMENT
tested + covid on wednesday, - test on Friday.  pt sts at 1330 today feeling numbness and pins and needles to right side of face, fingers and leg. aw shooting pain to back of head. NIH 0. ful rom/strngth, no facial droop.

## 2020-12-07 NOTE — H&P ADULT - NSHPPHYSICALEXAM_GEN_ALL_CORE
GENERAL: NAD, lying in bed comfortably  CHEST/LUNG: Clear to auscultation bilaterally; No rales, rhonchi, wheezing, or rubs. Unlabored respirations  HEART: Regular rate and rhythm; No murmurs, rubs, or gallops  ABDOMEN: Bowel sounds present; Soft, Nontender, Nondistended. No hepatomegally  EXTREMITIES:  2+ Peripheral Pulses, brisk capillary refill. No clubbing, cyanosis, or edema  NERVOUS SYSTEM:  Alert & Oriented X3, speech clear. No deficits   MSK: FROM all 4 extremities, full and equal strength  SKIN: No rashes or lesions GENERAL: NAD, lying in bed comfortably  CHEST/LUNG: Clear to auscultation bilaterally; No rales, rhonchi, wheezing, or rubs. Unlabored respirations  HEART: Regular rate and rhythm; No murmurs, rubs, or gallops  ABDOMEN: Bowel sounds present; Soft, Nontender, Nondistended. No hepatomegally  EXTREMITIES:  2+ Peripheral Pulses, brisk capillary refill. No clubbing, cyanosis, or edema  NERVOUS SYSTEM:  Alert & Oriented X3, speech clear. dec sensation on R arm/leg, no current facial sensation differences, no weakness, no gross cranial nerve deficits - current NIHSS 2  MSK: FROM all 4 extremities, full and equal strength  SKIN: No rashes or lesions

## 2020-12-07 NOTE — ED PROVIDER NOTE - PROGRESS NOTE DETAILS
SC: PT outside window for TPA, neuro requesting MRI w/o and MRV w/o. Patient to be admitted to an inpatient floor. Case discussed with and care endorsed to medical admitting resident. Admitting physician notified.

## 2020-12-07 NOTE — ED ADULT TRIAGE NOTE - INTERNATIONAL TRAVEL
[facility] EMERGENCY DEPARTMENT  [address]  [facility] [address]  [phone]      2017    Keysha Zaldivar  [address]  [place] [id number] [phone] (home) none (work)    : 1999      To Whom it may concern:    Keysha Zaldivar was seen in our Emergency Department today, 2017.  Return to school is based on symptom improvement expected in 1-3 days.    Sincerely,      Nancy Gallego MD    
No

## 2020-12-07 NOTE — H&P ADULT - NSHPLABSRESULTS_GEN_ALL_CORE
15.1   8.56  )-----------( 284      ( 07 Dec 2020 20:39 )             42.7     12-07    140  |  101  |  11  ----------------------------<  112<H>  3.6   |  27  |  1.0    Ca    10.0      07 Dec 2020 20:39    TPro  7.0  /  Alb  4.5  /  TBili  0.3  /  DBili  x   /  AST  19  /  ALT  25  /  AlkPhos  52  12-07    PT/INR - ( 07 Dec 2020 20:39 )   PT: 12.50 sec;   INR: 1.09 ratio      < from: CT Brain Stroke Protocol (12.07.20 @ 20:43) >    IMPRESSION:    No evidence of acute intracranial pathology. If symptoms persist, consider further evaluation with MRI brain.      < end of copied text >

## 2020-12-08 LAB
24R-OH-CALCIDIOL SERPL-MCNC: 31 NG/ML — SIGNIFICANT CHANGE UP (ref 30–80)
A1C WITH ESTIMATED AVERAGE GLUCOSE RESULT: 5 % — SIGNIFICANT CHANGE UP (ref 4–5.6)
ANION GAP SERPL CALC-SCNC: 11 MMOL/L — SIGNIFICANT CHANGE UP (ref 7–14)
BASOPHILS # BLD AUTO: 0.03 K/UL — SIGNIFICANT CHANGE UP (ref 0–0.2)
BASOPHILS NFR BLD AUTO: 0.5 % — SIGNIFICANT CHANGE UP (ref 0–1)
BUN SERPL-MCNC: 11 MG/DL — SIGNIFICANT CHANGE UP (ref 10–20)
CALCIUM SERPL-MCNC: 9.4 MG/DL — SIGNIFICANT CHANGE UP (ref 8.5–10.1)
CHLORIDE SERPL-SCNC: 104 MMOL/L — SIGNIFICANT CHANGE UP (ref 98–110)
CHOLEST SERPL-MCNC: 143 MG/DL — SIGNIFICANT CHANGE UP
CO2 SERPL-SCNC: 24 MMOL/L — SIGNIFICANT CHANGE UP (ref 17–32)
CREAT SERPL-MCNC: 0.8 MG/DL — SIGNIFICANT CHANGE UP (ref 0.7–1.5)
EOSINOPHIL # BLD AUTO: 0.08 K/UL — SIGNIFICANT CHANGE UP (ref 0–0.7)
EOSINOPHIL NFR BLD AUTO: 1.3 % — SIGNIFICANT CHANGE UP (ref 0–8)
ESTIMATED AVERAGE GLUCOSE: 97 MG/DL — SIGNIFICANT CHANGE UP (ref 68–114)
GLUCOSE SERPL-MCNC: 96 MG/DL — SIGNIFICANT CHANGE UP (ref 70–99)
HCT VFR BLD CALC: 37.4 % — SIGNIFICANT CHANGE UP (ref 37–47)
HDLC SERPL-MCNC: 38 MG/DL — LOW
HGB BLD-MCNC: 13.1 G/DL — SIGNIFICANT CHANGE UP (ref 12–16)
IMM GRANULOCYTES NFR BLD AUTO: 0.3 % — SIGNIFICANT CHANGE UP (ref 0.1–0.3)
LIPID PNL WITH DIRECT LDL SERPL: 98 MG/DL — SIGNIFICANT CHANGE UP
LYMPHOCYTES # BLD AUTO: 2.29 K/UL — SIGNIFICANT CHANGE UP (ref 1.2–3.4)
LYMPHOCYTES # BLD AUTO: 37.5 % — SIGNIFICANT CHANGE UP (ref 20.5–51.1)
MAGNESIUM SERPL-MCNC: 1.9 MG/DL — SIGNIFICANT CHANGE UP (ref 1.8–2.4)
MCHC RBC-ENTMCNC: 30.3 PG — SIGNIFICANT CHANGE UP (ref 27–31)
MCHC RBC-ENTMCNC: 35 G/DL — SIGNIFICANT CHANGE UP (ref 32–37)
MCV RBC AUTO: 86.4 FL — SIGNIFICANT CHANGE UP (ref 81–99)
MONOCYTES # BLD AUTO: 0.41 K/UL — SIGNIFICANT CHANGE UP (ref 0.1–0.6)
MONOCYTES NFR BLD AUTO: 6.7 % — SIGNIFICANT CHANGE UP (ref 1.7–9.3)
NEUTROPHILS # BLD AUTO: 3.27 K/UL — SIGNIFICANT CHANGE UP (ref 1.4–6.5)
NEUTROPHILS NFR BLD AUTO: 53.7 % — SIGNIFICANT CHANGE UP (ref 42.2–75.2)
NON HDL CHOLESTEROL: 105 MG/DL — SIGNIFICANT CHANGE UP
NRBC # BLD: 0 /100 WBCS — SIGNIFICANT CHANGE UP (ref 0–0)
PLATELET # BLD AUTO: 225 K/UL — SIGNIFICANT CHANGE UP (ref 130–400)
POTASSIUM SERPL-MCNC: 4.1 MMOL/L — SIGNIFICANT CHANGE UP (ref 3.5–5)
POTASSIUM SERPL-SCNC: 4.1 MMOL/L — SIGNIFICANT CHANGE UP (ref 3.5–5)
RBC # BLD: 4.33 M/UL — SIGNIFICANT CHANGE UP (ref 4.2–5.4)
RBC # FLD: 12.1 % — SIGNIFICANT CHANGE UP (ref 11.5–14.5)
SODIUM SERPL-SCNC: 139 MMOL/L — SIGNIFICANT CHANGE UP (ref 135–146)
TRIGL SERPL-MCNC: 90 MG/DL — SIGNIFICANT CHANGE UP
WBC # BLD: 6.1 K/UL — SIGNIFICANT CHANGE UP (ref 4.8–10.8)
WBC # FLD AUTO: 6.1 K/UL — SIGNIFICANT CHANGE UP (ref 4.8–10.8)

## 2020-12-08 PROCEDURE — 99223 1ST HOSP IP/OBS HIGH 75: CPT

## 2020-12-08 PROCEDURE — 72141 MRI NECK SPINE W/O DYE: CPT | Mod: 26

## 2020-12-08 PROCEDURE — 99232 SBSQ HOSP IP/OBS MODERATE 35: CPT

## 2020-12-08 PROCEDURE — 70553 MRI BRAIN STEM W/O & W/DYE: CPT | Mod: 26

## 2020-12-08 PROCEDURE — 93306 TTE W/DOPPLER COMPLETE: CPT | Mod: 26

## 2020-12-08 PROCEDURE — 70546 MR ANGIOGRAPH HEAD W/O&W/DYE: CPT | Mod: 26,59

## 2020-12-08 RX ORDER — ASPIRIN/CALCIUM CARB/MAGNESIUM 324 MG
81 TABLET ORAL DAILY
Refills: 0 | Status: DISCONTINUED | OUTPATIENT
Start: 2020-12-08 | End: 2020-12-09

## 2020-12-08 RX ORDER — ATORVASTATIN CALCIUM 80 MG/1
40 TABLET, FILM COATED ORAL AT BEDTIME
Refills: 0 | Status: DISCONTINUED | OUTPATIENT
Start: 2020-12-08 | End: 2020-12-09

## 2020-12-08 RX ADMIN — Medication 81 MILLIGRAM(S): at 11:39

## 2020-12-08 RX ADMIN — ENOXAPARIN SODIUM 40 MILLIGRAM(S): 100 INJECTION SUBCUTANEOUS at 11:39

## 2020-12-08 NOTE — PHYSICAL THERAPY INITIAL EVALUATION ADULT - PERTINENT HX OF CURRENT PROBLEM, REHAB EVAL
41 yo F with a pmhx hepatitis B who presents to the ED with right face, arm, and leg numbness on 12/7. Pt being r/o for ischemic CVA, pseudotumor cerebri, less likely MS.

## 2020-12-08 NOTE — PROGRESS NOTE ADULT - ATTENDING COMMENTS
History, events, data and scans reviewed, patient examined at bedside on 12/08/2020. I agree with assessment and plan as outlined above.

## 2020-12-08 NOTE — PHYSICAL THERAPY INITIAL EVALUATION ADULT - SPECIFY REASON(S)
Upon assessment, pt is independent with bed mobility, transfers, and ambulation. Pt may benefit from OT service. Pt does not require skilled PT at this time.

## 2020-12-08 NOTE — SWALLOW BEDSIDE ASSESSMENT ADULT - SLP GENERAL OBSERVATIONS
Pt received sitting upright in bed, alert and oriented x3, no c/o pain. +room air. Pt reports speech and language at baseline. No dysarthria.

## 2020-12-08 NOTE — PROGRESS NOTE ADULT - SUBJECTIVE AND OBJECTIVE BOX
DELISA BAPTISTE 40y Female  MRN#: [id number]     SUBJECTIVE  Patient is a 40y old Female who presents with a chief complaint of Right Face, Arm and leg numbness (07 Dec 2020 22:19)  Currently admitted to medicine with the primary diagnosis of Numbness  Today is hospital day 1d, and this morning she reports some improvement in numbness    OBJECTIVE  PAST MEDICAL & SURGICAL HISTORY  No pertinent past medical history    No significant past surgical history      ALLERGIES:  penicillin (Unknown)  penicillins (Unknown)    MEDICATIONS:  STANDING MEDICATIONS  aspirin enteric coated 81 milliGRAM(s) Oral daily  atorvastatin 40 milliGRAM(s) Oral at bedtime  chlorhexidine 4% Liquid 1 Application(s) Topical <User Schedule>  enoxaparin Injectable 40 milliGRAM(s) SubCutaneous daily    PRN MEDICATIONS      VITAL SIGNS: Last 24 Hours  T(C): 36.2 (08 Dec 2020 07:50), Max: 37 (07 Dec 2020 22:13)  T(F): 97.1 (08 Dec 2020 07:50), Max: 98.6 (07 Dec 2020 22:13)  HR: 81 (08 Dec 2020 07:50) (81 - 100)  BP: 108/64 (08 Dec 2020 07:50) (108/64 - 142/91)  BP(mean): --  RR: 18 (08 Dec 2020 07:50) (18 - 18)  SpO2: 97% (08 Dec 2020 07:50) (97% - 100%)    LABS:                        13.1   6.10  )-----------( 225      ( 08 Dec 2020 05:41 )             37.4     12-08    139  |  104  |  11  ----------------------------<  96  4.1   |  24  |  0.8    Ca    9.4      08 Dec 2020 05:41  Mg     1.9     12-08    TPro  7.0  /  Alb  4.5  /  TBili  0.3  /  DBili  x   /  AST  19  /  ALT  25  /  AlkPhos  52  12-07    PT/INR - ( 07 Dec 2020 20:39 )   PT: 12.50 sec;   INR: 1.09 ratio         PTT - ( 07 Dec 2020 20:39 )  PTT:30.4 sec      Lactate, Blood: 1.8 mmol/L (12-07-20 @ 20:39)  Troponin T, Serum: <0.01 ng/mL (12-07-20 @ 20:39)      CARDIAC MARKERS ( 07 Dec 2020 20:39 )  x     / <0.01 ng/mL / x     / x     / x          RADIOLOGY:    PHYSICAL EXAM:    GENERAL: NAD, well-developed, AAOx3  HEENT:  Atraumatic, Normocephalic. EOMI, PERRLA, conjunctiva and sclera clear, No JVD  PULMONARY: Clear to auscultation bilaterally; No wheeze  CARDIOVASCULAR: Regular rate and rhythm; No murmurs, rubs, or gallops  GASTROINTESTINAL: Soft, Nontender, Nondistended; Bowel sounds present  MUSCULOSKELETAL:  2+ Peripheral Pulses, No clubbing, cyanosis, or edema  NEUROLOGY: CN 2-12 grossly intact, possible decrease sensation in right sided facial nerve distribution, NIHSS=1  SKIN: No rashes or lesions      ASSESSMENT & PLAN  39 yo F with a pmhx hepatitis B who presents to the ED with right face, arm, and leg numbness on 12/7   CT head negative  CT perfusion WNL     #right sided numbness - low suspicion for CVA,  DDx includes: complicated migraine, vs idiopathic intracranial hypertension (IIH aka pseudotumor cerebri)  - Q4 neuro checks and Permissive HTN  - continue Aspirin 325 mg x 1 then 81 mg daily   - continue Lipitor 40 mg HS  - FU  brain MRI and MRV   - MR cervical spine  - FU 2D echocardiogram with bubble  - lipid panel and Hba1c WNL    # GI PPx - Not indicated  # DVT PPx -  Lovenox 40 mg SubQ    # Activity -  (X) Increase as Tolerated  # Dispo -   Patient to be discharged when medically optimized.  # Code Status - (X) FULL

## 2020-12-08 NOTE — PHYSICAL THERAPY INITIAL EVALUATION ADULT - SENSORY TESTS
Sensation to light touch and deep pressure were mildly impaired on the distal RUE, distal RLE and slightly diminished on right submandibular area. Sensation on LUE/LLE intact.

## 2020-12-08 NOTE — SWALLOW BEDSIDE ASSESSMENT ADULT - SLP PERTINENT HISTORY OF CURRENT PROBLEM
41 y/o F presented w/ R face/arm/leg numbness. Stroke code, CTH (-). MRI pending. COVID (+) 12/2, however COVID (-) upon admission. Pt passed dysphagia screen. No history of dysphagia.

## 2020-12-08 NOTE — PROGRESS NOTE ADULT - SUBJECTIVE AND OBJECTIVE BOX
Neurology Progress Note    Interval History:    HPI:  41 yo F with a pmhx hepatitis B, and  COVID + ( 12/2 but currently negative) who presents to the ED with right face, arm, and leg numbness on 12/7 since 1 or 1:30 pm. She denies focal weakness, aphasia, dysarthria, visual change, or other focal neurologic deficit. Of note patient does complain of three weeks of new onset, daily headache which she attributed to her new oral contraceptive (OCP) regimen. Her OB/GYN started her on OCP due to heavy menstrual bleeding about two months ago and the patient decided to stop the medication about one week ago due to the headaches. The headaches did not improve with cessation of OCP and states that it is not relieved by OTC analgesics  These headaches are thus something she has never experienced before and she states they are bifrontal, throbbing, moderately-severe, and debilitating lasting over thirty minutes. She denies thunderclap at onset; scintillations, unilateral tearing, sweating, redness, and neck pain are denied. Numbness had not been an issue prior to today. No family/personal hx of migraines.     In the ED, Vitals were WNL, STROKE CODE was called. Neuro saw pt, initial NIHSS 2.   CT head & CTA did not show any intracranial pathology.   Gave her , atorva 40.  (07 Dec 2020 22:19)        Vital Signs Last 24 Hrs  T(C): 36.2 (08 Dec 2020 07:50), Max: 37 (07 Dec 2020 22:13)  T(F): 97.1 (08 Dec 2020 07:50), Max: 98.6 (07 Dec 2020 22:13)  HR: 81 (08 Dec 2020 07:50) (81 - 100)  BP: 108/64 (08 Dec 2020 07:50) (108/64 - 142/91)  BP(mean): --  RR: 18 (08 Dec 2020 07:50) (18 - 18)  SpO2: 97% (08 Dec 2020 07:50) (97% - 100%)    Neurological Exam:   Mental status: Awake, alert and oriented x3.  Recent and remote memory intact.  Naming, repetition and comprehension intact.  Attention/concentration intact.  No dysarthria, no aphasia.  Fund of knowledge appropriate.    Cranial nerves: Pupils equally round and reactive to light, visual fields full, no nystagmus, extraocular muscles intact, V1 through V3 intact bilaterally and symmetric, face symmetric, hearing intact to finger rub, palate elevation symmetric, tongue was midline.  Motor:  MRC grading 5/5 b/l UE/LE.   strength 5/5.  Normal tone and bulk.  No abnormal movements.    Sensation: right arm parasthesias  Coordination: No dysmetria on finger-to-nose and heel-to-shin.  No dysdiadokinesia.  Gait: deferred  NIHSS1    Medications:  MEDICATIONS  (STANDING):  aspirin enteric coated 81 milliGRAM(s) Oral daily  atorvastatin 40 milliGRAM(s) Oral at bedtime  chlorhexidine 4% Liquid 1 Application(s) Topical <User Schedule>  enoxaparin Injectable 40 milliGRAM(s) SubCutaneous daily      Labs:  CBC Full  -  ( 08 Dec 2020 05:41 )  WBC Count : 6.10 K/uL  RBC Count : 4.33 M/uL  Hemoglobin : 13.1 g/dL  Hematocrit : 37.4 %  Platelet Count - Automated : 225 K/uL  Mean Cell Volume : 86.4 fL  Mean Cell Hemoglobin : 30.3 pg  Mean Cell Hemoglobin Concentration : 35.0 g/dL  Auto Neutrophil # : 3.27 K/uL  Auto Lymphocyte # : 2.29 K/uL  Auto Monocyte # : 0.41 K/uL  Auto Eosinophil # : 0.08 K/uL  Auto Basophil # : 0.03 K/uL  Auto Neutrophil % : 53.7 %  Auto Lymphocyte % : 37.5 %  Auto Monocyte % : 6.7 %  Auto Eosinophil % : 1.3 %  Auto Basophil % : 0.5 %    12-08    139  |  104  |  11  ----------------------------<  96  4.1   |  24  |  0.8    Ca    9.4      08 Dec 2020 05:41  Mg     1.9     12-08    TPro  7.0  /  Alb  4.5  /  TBili  0.3  /  DBili  x   /  AST  19  /  ALT  25  /  AlkPhos  52  12-07    LIVER FUNCTIONS - ( 07 Dec 2020 20:39 )  Alb: 4.5 g/dL / Pro: 7.0 g/dL / ALK PHOS: 52 U/L / ALT: 25 U/L / AST: 19 U/L / GGT: x           PT/INR - ( 07 Dec 2020 20:39 )   PT: 12.50 sec;   INR: 1.09 ratio         PTT - ( 07 Dec 2020 20:39 )  PTT:30.4 s

## 2020-12-08 NOTE — PHYSICAL THERAPY INITIAL EVALUATION ADULT - GENERAL OBSERVATIONS, REHAB EVAL
9:20-9:55am. Chart reviewed. Pt. seen semirecline in bed in No apparent distress. Pt presents with R sided weakness/numbness on face, arm, and leg. Pt has impaired sensation to light touch and deep pressure on right side.

## 2020-12-08 NOTE — PHYSICAL THERAPY INITIAL EVALUATION ADULT - COORDINATION ASSESSED, REHAB EVAL
Mildly impaired on right side with finger to nose. left side finger to nose, left and right heel to shin were intact./finger to nose/heel to shin

## 2020-12-08 NOTE — PATIENT PROFILE ADULT - FALL HARM RISK
Physical Therapy Daily Treatment    Visit Count: 6  Plan of Care: 2018 Through: 2018   Insurance Information: AUTHORIZATION:  Not required as long as we are in network, which we are verified to be on 10/16/2014.  VISITS:  Unlimited.  Based on Medical Necessity.  DEDUCTIBLE:  NA.  OOP:  NA.  COINSURANCE:  100% of what Medicaid covers.  COPAY:  No.  Effective Date:  2018   Right tilt, Left turn preference    SUBJECTIVE   Caregiver Present: mother  Functional change noted by parent: tolerating prone betterCurrent Pain (0-10 Face, Legs, Activity, Cry, Consolability (FLACC) scale): 0      OBJECTIVE   Observation:  Tolerating prone with midline head control more consistently.        Cervical Range of Motion:      Left  cervical rotation: 80 degrees actively;  90 passively.   Right cervical rotation: 65-70 degrees actively; 90 passively.   Left lateral cervical flexion: 10 degrees actively; 45 passively   Right lateral cervical flexion: 45 degrees actively; 45  passively    Treatment   Therapeutic Activity:   Skin irritation only slight in right cervical skin folds.  Discussed progress with prone skills since last visit. Mother reports patient less fussy with tummy time.  Demonstrated and mother practiced facilitation of shoulder/scapular depression bilaterally.  Added rolling to prone to the right.    Neuro Re-education:   Active, active assistive ROM and PROM activities in supine, prone and supported sit on floor and on therapy ball to increase left lateral cervical flexion and left cervical rotation. Supine, supported sit and prone visual tracking activities to increase cervical extension and attention to midline and right upper/lower visual field.    Manual Therapy:  soft tissue mobilization: Left SCM and levator scapulae    Home Program:   Family provided: torticollis handouts level one, emphasizing prone time, right side lying and midline head control in all positions.      ASSESSMENT   Great progress  with prone skills.  Head tilt right 25-30 degrees majority of the time.     Pain after treatment (patient reported, 0-10 FLACC scale): 0  Result of above outlined education: Verbalizes understanding, Demonstrates understanding and Needs reinforcement    Goals:       To be obtained by end of this plan of care:  1. Caregiver(s) independent with modified and progressed home exercise program.  2. Patient will demonstrate age appropriate symmetrical gross motor skills.  3. Patient will attain and maintain neutral neck position in all upright postures.  4. Patient will be able to bring hands to midline while supine to reach for a toy.  5. Patient will independently rotate head 100% bilaterally to use their full visual field to take in their environment.  6. Patient will increase cervical functional strength to demonstrate active head righting equally to either side to aide in protective/balance reactions.  7. Patient will increase cervical functional strength to be able to hold head midline with pull to sit.  8. Patient will increase cervical functional strength to demonstrate emergent chin tuck with pull to sit.  9. Patient will demonstrate independent prone on elbows with 90 degrees neck extension and neutral head to progress developmental skills.  10. Patient will demonstrate independent prone on extended arms with neutral head to progress developmental skills.  11. Patient will demonstrate independent prone and weight shifting left and right with neutral head to reach for toys.  12. Patient will independently roll supine to/from either side to independently change positions while sleeping.  13. Patient will independently roll supine to/from prone bilaterally to progress independent mobility skills.       PLAN    Frequency/Duration: 1 times per week for 12 weeks with tapering as the patient progresses      Skilled training and instruction for the following interventions:  Manual Therapy (01910)  Neuromuscular  Re-Education (86232)  Therapeutic Activity (60068)        THERAPY DAILY BILLING   Insurance: [facility] 2. N/A    Evaluation Procedures:  No evaluation codes were used on this date of service    Timed Procedures:  Manual Therapy, 10 minutes  Neuromuscular Re-Education, 15 minutes  Therapeutic Activity, 10 minutes    Untimed Procedures:  No untimed codes were used on this date of service    Total Treatment Time: 35 minutes       other/dx

## 2020-12-09 ENCOUNTER — TRANSCRIPTION ENCOUNTER (OUTPATIENT)
Age: 40
End: 2020-12-09

## 2020-12-09 VITALS
RESPIRATION RATE: 16 BRPM | OXYGEN SATURATION: 100 % | SYSTOLIC BLOOD PRESSURE: 87 MMHG | HEART RATE: 75 BPM | DIASTOLIC BLOOD PRESSURE: 61 MMHG | TEMPERATURE: 98 F

## 2020-12-09 LAB
ANION GAP SERPL CALC-SCNC: 10 MMOL/L — SIGNIFICANT CHANGE UP (ref 7–14)
BUN SERPL-MCNC: 13 MG/DL — SIGNIFICANT CHANGE UP (ref 10–20)
CALCIUM SERPL-MCNC: 8.9 MG/DL — SIGNIFICANT CHANGE UP (ref 8.5–10.1)
CHLORIDE SERPL-SCNC: 104 MMOL/L — SIGNIFICANT CHANGE UP (ref 98–110)
CO2 SERPL-SCNC: 25 MMOL/L — SIGNIFICANT CHANGE UP (ref 17–32)
CREAT SERPL-MCNC: 0.8 MG/DL — SIGNIFICANT CHANGE UP (ref 0.7–1.5)
GLUCOSE SERPL-MCNC: 91 MG/DL — SIGNIFICANT CHANGE UP (ref 70–99)
HCT VFR BLD CALC: 40.1 % — SIGNIFICANT CHANGE UP (ref 37–47)
HGB BLD-MCNC: 13.9 G/DL — SIGNIFICANT CHANGE UP (ref 12–16)
MCHC RBC-ENTMCNC: 30 PG — SIGNIFICANT CHANGE UP (ref 27–31)
MCHC RBC-ENTMCNC: 34.7 G/DL — SIGNIFICANT CHANGE UP (ref 32–37)
MCV RBC AUTO: 86.6 FL — SIGNIFICANT CHANGE UP (ref 81–99)
NRBC # BLD: 0 /100 WBCS — SIGNIFICANT CHANGE UP (ref 0–0)
PLATELET # BLD AUTO: 224 K/UL — SIGNIFICANT CHANGE UP (ref 130–400)
POTASSIUM SERPL-MCNC: 4.2 MMOL/L — SIGNIFICANT CHANGE UP (ref 3.5–5)
POTASSIUM SERPL-SCNC: 4.2 MMOL/L — SIGNIFICANT CHANGE UP (ref 3.5–5)
RBC # BLD: 4.63 M/UL — SIGNIFICANT CHANGE UP (ref 4.2–5.4)
RBC # FLD: 12.4 % — SIGNIFICANT CHANGE UP (ref 11.5–14.5)
SODIUM SERPL-SCNC: 139 MMOL/L — SIGNIFICANT CHANGE UP (ref 135–146)
WBC # BLD: 6.51 K/UL — SIGNIFICANT CHANGE UP (ref 4.8–10.8)
WBC # FLD AUTO: 6.51 K/UL — SIGNIFICANT CHANGE UP (ref 4.8–10.8)

## 2020-12-09 PROCEDURE — 99239 HOSP IP/OBS DSCHRG MGMT >30: CPT

## 2020-12-09 RX ADMIN — Medication 81 MILLIGRAM(S): at 11:04

## 2020-12-09 RX ADMIN — ATORVASTATIN CALCIUM 40 MILLIGRAM(S): 80 TABLET, FILM COATED ORAL at 01:48

## 2020-12-09 NOTE — DISCHARGE NOTE PROVIDER - NSDCFUADDINST_GEN_ALL_CORE_FT
Follow up with your PMD in 1-2 weeks. Please discuss with your PMD your overall condition specifically surrounding your current situation.    Follow up with your PMD in 1-2 weeks. Please discuss with your PMD your overall condition specifically surrounding your current situation.     Follow up with neurosurgery Dr. Hastings

## 2020-12-09 NOTE — CHART NOTE - NSCHARTNOTEFT_GEN_A_CORE
Patient to follow up with neurosurgery on outpatient basis - Dr. Mohini Hastings at [address] [facility], [phone]

## 2020-12-09 NOTE — DISCHARGE NOTE PROVIDER - NSDCCPCAREPLAN_GEN_ALL_CORE_FT
PRINCIPAL DISCHARGE DIAGNOSIS  Diagnosis: Numbness  Assessment and Plan of Treatment:   In the ED, Vitals were WNL, STROKE CODE was called. Neuro saw pt, initial NIHSS 2.   CT perfusion WNL.   Patient was admitted for possible stoke,   She was seen by neurology who recommended MRI/MRV and MR cervical spine  You resented for right face and arm numbness.   CT head was normal  MRI BRAIN:  No acute intracranial pathology or abnormal enhancement.  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 Otherwise unremarkable MRV of the head.  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 No significant spinal canal stenosis.  You also echocardio: which was WNL  You were seen by by neurosurgery.

## 2020-12-09 NOTE — DISCHARGE NOTE PROVIDER - HOSPITAL COURSE
41 yo F with a pmhx hepatitis B, and  COVID + ( 12/2 but currently negative) who presents to the ED with right face, arm, and leg numbness on 12/7 since 1 or 1:30 pm. She denies focal weakness, aphasia, dysarthria, visual change, or other focal neurologic deficit. Of note patient does complain of three weeks of new onset, daily headache which she attributed to her new oral contraceptive (OCP) regimen. Her OB/GYN started her on OCP due to heavy menstrual bleeding about two months ago and the patient decided to stop the medication about one week ago due to the headaches. The headaches did not improve with cessation of OCP and states that it is not relieved by OTC analgesics  These headaches are thus something she has never experienced before and she states they are bifrontal, throbbing, moderately-severe, and debilitating lasting over thirty minutes. She denies thunderclap at onset; scintillations, unilateral tearing, sweating, redness, and neck pain are denied. Numbness had not been an issue prior to today. No family/personal hx of migraines.     In the ED, Vitals were WNL, STROKE CODE was called. Neuro saw pt, initial NIHSS 2.   CT perfusion WNL.   Patient was admitted for possible stoke,   She was seen by neurology who recommended MRI/MRV and MR cervical spine    MRI BRAIN:  No acute intracranial pathology or abnormal enhancement.  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 Otherwise unremarkable MRV of the head.  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 No significant spinal canal stenosis.    She also had an echocardio:  from: TTE Echo Complete w/o Contrast w/ Doppler.   1. LV Ejection Fraction by Solares's Method with a biplane EF of 62 %.   2. The mean global longitudinal peak strain by speckle tracking is -14.6% (Hector) which is reduced.   3. Normal left atrial size.   4. Normal right atrial size.   5. No evidence of mitral valve regurgitation.   6. Mild tricuspid regurgitation.   7. LA volume Index is 19.1 ml/m² ml/m2.    She was seen by neurosurgery.                        39 yo F with a pmhx hepatitis B, and  COVID + ( 12/2 but currently negative) who presents to the ED with right face, arm, and leg numbness on 12/7 since 1 or 1:30 pm. She denies focal weakness, aphasia, dysarthria, visual change, or other focal neurologic deficit. Of note patient does complain of three weeks of new onset, daily headache which she attributed to her new oral contraceptive (OCP) regimen. Her OB/GYN started her on OCP due to heavy menstrual bleeding about two months ago and the patient decided to stop the medication about one week ago due to the headaches. The headaches did not improve with cessation of OCP and states that it is not relieved by OTC analgesics  These headaches are thus something she has never experienced before and she states they are bifrontal, throbbing, moderately-severe, and debilitating lasting over thirty minutes. She denies thunderclap at onset; scintillations, unilateral tearing, sweating, redness, and neck pain are denied. Numbness had not been an issue prior to today. No family/personal hx of migraines.     In the ED, Vitals were WNL, STROKE CODE was called. Neuro saw pt, initial NIHSS 2.   CT perfusion WNL.   Patient was admitted for possible stoke,   She was seen by neurology who recommended MRI/MRV and MR cervical spine    MRI BRAIN:  No acute intracranial pathology or abnormal enhancement.  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 Otherwise unremarkable MRV of the head.  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 No significant spinal canal stenosis.    She also had an echocardio:  from: TTE Echo Complete w/o Contrast w/ Doppler.   1. LV Ejection Fraction by Solares's Method with a biplane EF of 62 %.   2. The mean global longitudinal peak strain by speckle tracking is -14.6% (Hector) which is reduced.   3. Normal left atrial size.   4. Normal right atrial size.   5. No evidence of mitral valve regurgitation.   6. Mild tricuspid regurgitation.   7. LA volume Index is 19.1 ml/m² ml/m2.    Patient is to follow up outpatient with neurosurgery for c5-c5 foraminal narrowing

## 2020-12-09 NOTE — ED ADULT NURSE REASSESSMENT NOTE - NS ED NURSE REASSESS COMMENT FT1
patient a.ox3 discharge instructions provided. all questions answered. patient has all belongings. iv removed. patient safely discharged
Pt is calm, is sleeping comfortable since 2300 last night. Pt vital signs is stable. stroke precautions in place, to continue monitoring.

## 2020-12-09 NOTE — DISCHARGE NOTE PROVIDER - PROVIDER TOKENS
FREE:[LAST:[Moisés],FIRST:[Tariq],PHONE:[phone]],FAX:[(   )    -],ADDRESS:[[address] [place] [address]]] FREE:[LAST:[Moisés],FIRST:[Tariq],PHONE:[phone]],FAX:[(   )    -],ADDRESS:[[address] [place] [address]]],PROVIDER:[TOKEN:[[id number]]]

## 2020-12-09 NOTE — DISCHARGE NOTE NURSING/CASE MANAGEMENT/SOCIAL WORK - PATIENT PORTAL LINK FT
You can access the FollowMyHealth Patient Portal offered by [facility] by registering at the following website: [email or web address]. By joining [facility]’s FollowMyHealth portal, you will also be able to view your health information using other applications (apps) compatible with our system.

## 2020-12-09 NOTE — PROGRESS NOTE ADULT - SUBJECTIVE AND OBJECTIVE BOX
pt is seen and examined. feels better but still has mild numbness on the arm   Vital Signs Last 24 Hrs  T(C): 36.8 (09 Dec 2020 08:08), Max: 36.9 (09 Dec 2020 07:35)  T(F): 98.2 (09 Dec 2020 08:08), Max: 98.4 (09 Dec 2020 07:35)  HR: 75 (09 Dec 2020 08:08) (75 - 90)  BP: 87/61 (09 Dec 2020 08:08) (87/61 - 120/68)  BP(mean): --  RR: 16 (09 Dec 2020 08:08) (16 - 20)  SpO2: 100% (09 Dec 2020 08:08) (98% - 100%)  Physical exam:   constitutional NAD, AAOX3, Respiratory  lungs CTA, CVS heart RRR, GI: abdomen Soft NT, ND, BS+, skin: intact  neuro exam non focal.                         13.9   6.51  )-----------( 224      ( 09 Dec 2020 06:51 )             40.1   12-09    139  |  104  |  13  ----------------------------<  91  4.2   |  25  |  0.8    Ca    8.9      09 Dec 2020 06:51  Mg     1.9     12-08    TPro  7.0  /  Alb  4.5  /  TBili  0.3  /  DBili  x   /  AST  19  /  ALT  25  /  AlkPhos  52  12-07    < from: MR Head w/wo IV Cont (12.08.20 @ 15:21) >  MRI BRAIN:  No acute intracranial pathology or abnormal enhancement.    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 Otherwise unremarkable MRV of the head.    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 No significant spinal canal stenosis.    < end of copied text >    discussed cervical spine finding with neurosurgery, no intervension at this time, recommend outpt followup     a/p  right arm weakness and numbness due  to c5-c6 disc herniation. follow up with neurosurgery as outpt    hypotension, asymptomatic. no intervension    dc home   time spent 35 min

## 2020-12-09 NOTE — DISCHARGE NOTE PROVIDER - CARE PROVIDER_API CALL
Tariq Curiel  [address]  [facility] [address]  Phone: [phone]  Fax: (   )    -  Follow Up Time:    Tariq Curiel  [address]  [facility] [address]  Phone: [phone]  Fax: (   )    -  Follow Up Time:     Mohini Hastings  [facility]  [address] [place] [address]  Phone: [phone]  Fax: [phone]  Follow Up Time:

## 2020-12-11 DIAGNOSIS — M50.222 OTHER CERVICAL DISC DISPLACEMENT AT C5-C6 LEVEL: ICD-10-CM

## 2020-12-11 DIAGNOSIS — E28.2 POLYCYSTIC OVARIAN SYNDROME: ICD-10-CM

## 2020-12-11 DIAGNOSIS — Z88.0 ALLERGY STATUS TO PENICILLIN: ICD-10-CM

## 2020-12-11 DIAGNOSIS — E66.9 OBESITY, UNSPECIFIED: ICD-10-CM

## 2020-12-11 DIAGNOSIS — I95.9 HYPOTENSION, UNSPECIFIED: ICD-10-CM

## 2020-12-19 ENCOUNTER — RESULT REVIEW (OUTPATIENT)
Age: 40
End: 2020-12-19

## 2020-12-19 ENCOUNTER — OUTPATIENT (OUTPATIENT)
Dept: OUTPATIENT SERVICES | Facility: HOSPITAL | Age: 40
LOS: 1 days | Discharge: HOME | End: 2020-12-19
Payer: COMMERCIAL

## 2020-12-19 DIAGNOSIS — R91.8 OTHER NONSPECIFIC ABNORMAL FINDING OF LUNG FIELD: ICD-10-CM

## 2020-12-19 PROCEDURE — 71250 CT THORAX DX C-: CPT | Mod: 26

## 2021-01-22 ENCOUNTER — TRANSCRIPTION ENCOUNTER (OUTPATIENT)
Age: 41
End: 2021-01-22

## 2021-03-29 ENCOUNTER — OUTPATIENT (OUTPATIENT)
Dept: OUTPATIENT SERVICES | Facility: HOSPITAL | Age: 41
LOS: 1 days | Discharge: HOME | End: 2021-03-29
Payer: COMMERCIAL

## 2021-03-29 ENCOUNTER — RESULT REVIEW (OUTPATIENT)
Age: 41
End: 2021-03-29

## 2021-03-29 DIAGNOSIS — R92.8 OTHER ABNORMAL AND INCONCLUSIVE FINDINGS ON DIAGNOSTIC IMAGING OF BREAST: ICD-10-CM

## 2021-03-29 PROCEDURE — 76642 ULTRASOUND BREAST LIMITED: CPT | Mod: 26,RT

## 2021-03-31 ENCOUNTER — APPOINTMENT (OUTPATIENT)
Dept: NEUROLOGY | Facility: CLINIC | Age: 41
End: 2021-03-31

## 2021-05-07 ENCOUNTER — APPOINTMENT (OUTPATIENT)
Dept: BREAST CENTER | Facility: CLINIC | Age: 41
End: 2021-05-07

## 2021-05-20 NOTE — ED ADULT NURSE NOTE - FINAL NURSING ELECTRONIC SIGNATURE
Subjective:      Patient Id:  Vashti Gotti is a 64 y.o. female who presents today with No chief complaint on file. HPI     Seen at [facility] for symptom management follow up rt Lung Cancer, COPD, CAD, DM 2, HTN, HLD. Bipolar 1, schizophrenia, and history of alcohol abuse. Today demeanor calm and relaxed, NAD, no sedation. Being treated by Dr José Gotti of [facility] for [facility] of left lung    resection of a soft tissue mass form the abdominal wall with pah showing poorly differentiated carcinoma with squamous features on 6/11/19. Pt seen in oncology >4 months later, after hospitalization at [facility] for CP. Initial CXR showed a L suprahilar mass and CT chest obtained on 11//19 showed an approximately 10cm L upper chest mass with associated encasement and narrowing of L hilar vessels and central airways c/w malignancy/bronchogenic carcinoma until proven otherwise. Small L pleural effusion. \" No evidence of metastatic disease on CT a/p except for trace nonspecific fluid in the pelvis. PET/CT as well as bronch with EBUS ordered Confirming diseaes only in the large L upper chest mass (all bx of mediastinal nodes negative for malignancy). Pt's case was discussed in tumor board meeting on 1/6/19 with consensus recommendation to proceed with aggressive tx with chemo/RT given now no evidence of metastatic disease and overall relatively indolent course. Pt completed concurrent chemo/RT with weekly carbo/taxol on 3/6/20. Maintenance tx with durvalumab started on 4/16/20. RADIOLOGY:   2/25/2021 1:45 PM - Radiology, [place]  IMPRESSION:   1. Mediastinal/left upper lobe mass is similar in size when compared to   prior exam.   2. Additional right-sided nodular opacities are either stable or   slightly increased in size. 3. Interval increase in size of subcarinal adenopathy. No new bulky   mediastinal adenopathy is seen. 4. Dense consolidative opacity in the left lung, slightly progressed at   the left apex. Stable small left pleural effusion. tolerating maintenance durvalumab well with no obvious SE and with good clinical and radiologic response so plan to complete per schedule (last tx 5/13). Plan for repeat imaging prior to next f/up and if stable, then will monitor q 3 months with imaging ( per [facility] chart)    Complains of pain in left upper chest area, began after radiation, described as a burning, often radiates down her fingertips and down her thoracic back. Pain is worse in am and with movement. She avoids using the arm though stregth and  are equal with right. She has not tried PT, \" My doctor told me it would not help\". Crackles are auscultated under area of pain. She tried gabapentin in the past without improvement. She is taking Norco 5/325 mg po tid, \" it takes the edge off\" admits to end of dose pain. She is able to maintain her normal level of function. She tells me she is not working with any mental health providers, nor on any  Medications. She stopped drinking three years ago and her symptoms improved dramatically. Denies auditory or visual hallucinations. No homicidal or suicidal ideations, no amilcar or grandiose behavior, no sleep difficulty. No significant anxiety, agitation, or depression. Negative headaches, dizziness, or vision changes. No dysphagia, dysgeusia, or mouth sores; weight stable, Appetite is good. No GI or  concerns. No renato blood in stool or urine. SOB and edema at baseline. Negative excessive fatigue, fever, chills, myalgias, increased sputum production or cough, nausea, vomiting, chest pain, or orthopnea.     Past Medical History:   Diagnosis Date    Abnormal Pap smear of cervix     Asthma     Bacterial vaginosis     Bipolar 1 disorder ([address]     CAD (coronary artery disease)     COPD (chronic obstructive pulmonary disease) (HCC)     chronic bronchitis    Diabetes ([address]     Ectopic pregnancy     Hypertension     Lung cancer ([address]     Obesity     Schizophrenia ([facility]     Type 2 diabetes mellitus without complication ([facility]      Past Surgical History:   Procedure Laterality Date    ABDOMEN SURGERY       SECTION      CHOLECYSTECTOMY      DILATION AND CURETTAGE OF UTERUS      TUBAL LIGATION      UMBILICAL HERNIA REPAIR       Social History     Socioeconomic History    Marital status:      Spouse name: Not on file    Number of children: Not on file    Years of education: Not on file    Highest education level: Not on file   Occupational History    Not on file   Tobacco Use    Smoking status: Former Smoker     Packs/day: 1.50     Years: 38.00     Pack years: 57.00     Types: Cigarettes     Start date: 48     Quit date: 2019     Years since quittin.5    Smokeless tobacco: Never Used   Vaping Use    Vaping Use: Never used   Substance and Sexual Activity    Alcohol use: Not Currently    Drug use: Not Currently    Sexual activity: Yes   Other Topics Concern    Not on file   Social History Narrative    Not on file     Social Determinants of Health     Financial Resource Strain:     Difficulty of Paying Living Expenses:    Food Insecurity:     Worried About Running Out of Food in the Last Year:     [address] in the Last Year:    Transportation Needs:     Lack of Transportation (Medical):      Lack of Transportation (Non-Medical):    Physical Activity:     Days of Exercise per Week:     Minutes of Exercise per Session:    Stress:     Feeling of Stress :    Social Connections:     Frequency of Communication with Friends and Family:     Frequency of Social Gatherings with Friends and Family:     Attends [employer] Services:     Active Member of Clubs or Organizations:     Attends Club or Organization Meetings:     Marital Status:    Intimate Partner Violence:     Fear of Current or Ex-Partner:     Emotionally Abused:     Physically Abused:     Sexually Abused:      Family History   Problem Relation Age of Onset    Diabetes Mother     High Blood Pressure Mother     Coronary Art Dis Father     High Blood Pressure Father     Diabetes Father      Allergies   Allergen Reactions    Morphine Anaphylaxis    Demerol Hcl [Meperidine] Itching     Current Outpatient Medications on File Prior to Visit   Medication Sig Dispense Refill    polyethylene glycol (MIRALAX) 17 g PACK packet Take 17 g by mouth daily      ipratropium-albuterol (DUONEB) 0.5-2.5 (3) MG/3ML SOLN nebulizer solution Inhale 1 vial into the lungs every 4 hours      amLODIPine (NORVASC) 10 MG tablet Take 10 mg by mouth daily      glipiZIDE (GLUCOTROL) 5 MG tablet Take 5 mg by mouth daily      losartan (COZAAR) 50 MG tablet Take 50 mg by mouth daily      potassium chloride (KLOR-CON M) 20 MEQ extended release tablet Take 20 mEq by mouth       No current facility-administered medications on file prior to visit. Review of Systems   Constitutional: Negative for activity change, appetite change, chills, diaphoresis, fatigue, fever and unexpected weight change. HENT: Negative for drooling, hearing loss, mouth sores, sore throat, trouble swallowing and voice change. Eyes: Negative for discharge and visual disturbance. Respiratory: Negative for apnea, cough, choking, chest tightness, shortness of breath, wheezing and stridor. Cardiovascular: Negative for chest pain, palpitations and leg swelling. Gastrointestinal: Negative for abdominal distention, abdominal pain, anal bleeding, blood in stool, constipation, diarrhea, nausea, rectal pain and vomiting. Genitourinary: Negative for difficulty urinating, dysuria, enuresis, frequency and hematuria. Musculoskeletal: Negative for arthralgias, back pain, gait problem, joint swelling and myalgias. Skin: Negative for color change, pallor, rash and wound. Allergic/Immunologic: Negative for food allergies and immunocompromised state.    Neurological: Negative for dizziness, tremors, seizures, syncope, facial asymmetry, speech difficulty, weakness, light-headedness, numbness and headaches. Hematological: Negative for adenopathy. Does not bruise/bleed easily. Psychiatric/Behavioral: Negative for agitation, behavioral problems, confusion, decreased concentration, dysphoric mood, hallucinations, self-injury, sleep disturbance and suicidal ideas. The patient is not nervous/anxious and is not hyperactive. Objective: There were no vitals taken for this visit. Physical Exam  Constitutional:       General: She is not in acute distress. Appearance: She is well-developed. She is not diaphoretic. HENT:      Head: Normocephalic and atraumatic. Right Ear: External ear normal.      Left Ear: External ear normal.      Nose: Nose normal.      Mouth/Throat:      Pharynx: No oropharyngeal exudate. Eyes:      General: No scleral icterus. Right eye: No discharge. Left eye: No discharge. Conjunctiva/sclera: Conjunctivae normal.      Pupils: Pupils are equal, round, and reactive to light. Neck:      Thyroid: No thyromegaly. Vascular: No JVD. Trachea: No tracheal deviation. Cardiovascular:      Rate and Rhythm: Normal rate and regular rhythm. Heart sounds: Normal heart sounds. Pulmonary:      Effort: Pulmonary effort is normal. No respiratory distress. Breath sounds: No stridor. Wheezing present. No rales. Chest:      Chest wall: No tenderness. Abdominal:      General: Bowel sounds are normal. There is no distension. Palpations: Abdomen is soft. There is no mass. Tenderness: There is no abdominal tenderness. There is no guarding or rebound. Musculoskeletal:         General: No tenderness or deformity. Normal range of motion. Cervical back: Normal range of motion and neck supple. Lymphadenopathy:      Cervical: No cervical adenopathy. Skin:     General: Skin is warm and dry.       Capillary Refill: Capillary refill takes less doing exceptionally well, but would like expert evaluation    Slow Transit Constipation due to opioid use  - Continue current POC  - Increase fluid and fiber in diet  - Exercise as tolerated  - Hold all for loose stool  - Call if no BM in three days      Stepan Alarcon, APRN - CNP 02-Dec-2020 21:46

## 2021-07-22 ENCOUNTER — APPOINTMENT (OUTPATIENT)
Dept: NEUROLOGY | Facility: CLINIC | Age: 41
End: 2021-07-22

## 2021-09-09 NOTE — ED PROVIDER NOTE - NSCAREINITIATED _GEN_ER
Problem: Pain:  Goal: Pain level will decrease  Description: Pain level will decrease  Outcome: Ongoing  Goal: Control of acute pain  Description: Control of acute pain  Outcome: Ongoing  Goal: Control of chronic pain  Description: Control of chronic pain  Outcome: Ongoing     Problem: Wound:  Goal: Will show signs of wound healing; wound closure and no evidence of infection  Description: Will show signs of wound healing; wound closure and no evidence of infection  Outcome: Ongoing
Regan Holley(Resident)

## 2021-09-22 ENCOUNTER — TRANSCRIPTION ENCOUNTER (OUTPATIENT)
Age: 41
End: 2021-09-22

## 2022-09-15 ENCOUNTER — APPOINTMENT (OUTPATIENT)
Dept: SURGERY | Facility: CLINIC | Age: 42
End: 2022-09-15

## 2022-09-15 VITALS — TEMPERATURE: 96.9 F | WEIGHT: 210 LBS | HEART RATE: 92 BPM | BODY MASS INDEX: 36.05 KG/M2 | OXYGEN SATURATION: 99 %

## 2022-09-15 DIAGNOSIS — N63.10 UNSPECIFIED LUMP IN THE RIGHT BREAST, UNSPECIFIED QUADRANT: ICD-10-CM

## 2022-09-15 PROCEDURE — 99202 OFFICE O/P NEW SF 15 MIN: CPT

## 2022-09-15 NOTE — PHYSICAL EXAM
[Normocephalic] : normocephalic [Supple] : supple [No Supraclavicular Adenopathy] : no supraclavicular adenopathy [Examined in the supine and seated position] : examined in the supine and seated position [Symmetrical] : symmetrical [Grade 2] : Ptosis Grade 2 [No dominant masses] : no dominant masses left breast [No Nipple Retraction] : no left nipple retraction [No Nipple Discharge] : no left nipple discharge [___cm] : ~M [unfilled] ~Ucm upper outer quadrant mass [Breast Mass Left Breast ___cm] : no masses [Breast Nipple Inversion] : nipples not inverted [Breast Nipple Retraction] : nipples not retracted [Breast Nipple Flattening] : nipples not flattened [Breast Nipple Fissures] : nipples not fissured [Breast Abnormal Lactation (Galactorrhea)] : no galactorrhea [Breast Abnormal Secretion Bloody Fluid] : no bloody discharge [Breast Abnormal Secretion Serous Fluid] : no serous discharge [Breast Abnormal Secretion Opalescent Fluid] : no milky discharge [No Axillary Lymphadenopathy] : no left axillary lymphadenopathy [de-identified] : 2cm area of fullness smooth over right periareolar area at 10-0 clock [de-identified] : at periareolar margin with no redness mild tenderness

## 2022-09-15 NOTE — HISTORY OF PRESENT ILLNESS
[FreeTextEntry1] : 42-year-old female presents for evaluation of a right breast lump which she felt it about 2 weeks ago. Because of the pain patient was seen by her primary care physician and was given antibiotics doxycycline which he continued and finished for 10 days. Patient reports some improvement of the pain but as per patient the mass has remained the same

## 2022-09-15 NOTE — ASSESSMENT
[FreeTextEntry1] : 42-year-old female presents for evaluation of right breast mass. Patient noted the mass approximately 2 weeks ago for which she was given antibiotics with some pain relief and the mass persisted at the same location around 10:00 position about 3 cm from the nipple at the periareolar margin. Hasn't smoked in slightly tender with mild fullness just below the skin level. No skin changes or lymphadenopathy seen. Patient was told that no antibiotic therapy is required as clinically there are no signs of infection. Patient was given a prescription for diagnostic mammograms program and screening left mammogram. Patient returned back after diagnostic studies

## 2022-10-05 ENCOUNTER — OUTPATIENT (OUTPATIENT)
Dept: OUTPATIENT SERVICES | Facility: HOSPITAL | Age: 42
LOS: 1 days | Discharge: HOME | End: 2022-10-05

## 2022-10-05 ENCOUNTER — RESULT REVIEW (OUTPATIENT)
Age: 42
End: 2022-10-05

## 2022-10-05 DIAGNOSIS — N64.4 MASTODYNIA: ICD-10-CM

## 2022-10-05 DIAGNOSIS — N64.59 OTHER SIGNS AND SYMPTOMS IN BREAST: ICD-10-CM

## 2022-10-05 PROCEDURE — G0279: CPT | Mod: 26

## 2022-10-05 PROCEDURE — 76642 ULTRASOUND BREAST LIMITED: CPT | Mod: 26,RT

## 2022-10-05 PROCEDURE — 77066 DX MAMMO INCL CAD BI: CPT | Mod: 26

## 2022-10-07 ENCOUNTER — RESULT REVIEW (OUTPATIENT)
Age: 42
End: 2022-10-07

## 2022-10-07 ENCOUNTER — OUTPATIENT (OUTPATIENT)
Dept: OUTPATIENT SERVICES | Facility: HOSPITAL | Age: 42
LOS: 1 days | Discharge: HOME | End: 2022-10-07

## 2022-10-07 PROCEDURE — 77065 DX MAMMO INCL CAD UNI: CPT | Mod: 26,RT

## 2022-10-07 PROCEDURE — 88342 IMHCHEM/IMCYTCHM 1ST ANTB: CPT | Mod: 26

## 2022-10-07 PROCEDURE — 88305 TISSUE EXAM BY PATHOLOGIST: CPT | Mod: 26

## 2022-10-07 PROCEDURE — 19083 BX BREAST 1ST LESION US IMAG: CPT | Mod: RT

## 2022-10-07 PROCEDURE — 88341 IMHCHEM/IMCYTCHM EA ADD ANTB: CPT | Mod: 26

## 2022-10-10 LAB — SURGICAL PATHOLOGY STUDY: SIGNIFICANT CHANGE UP

## 2022-10-12 DIAGNOSIS — N60.31 FIBROSCLEROSIS OF RIGHT BREAST: ICD-10-CM

## 2022-10-13 ENCOUNTER — APPOINTMENT (OUTPATIENT)
Dept: SURGERY | Facility: CLINIC | Age: 42
End: 2022-10-13

## 2022-10-13 VITALS
DIASTOLIC BLOOD PRESSURE: 76 MMHG | OXYGEN SATURATION: 92 % | BODY MASS INDEX: 35.85 KG/M2 | SYSTOLIC BLOOD PRESSURE: 112 MMHG | HEIGHT: 64 IN | TEMPERATURE: 96.9 F | WEIGHT: 210 LBS | HEART RATE: 89 BPM

## 2022-10-13 DIAGNOSIS — N60.91 UNSPECIFIED BENIGN MAMMARY DYSPLASIA OF RIGHT BREAST: ICD-10-CM

## 2022-10-13 PROCEDURE — 99212 OFFICE O/P EST SF 10 MIN: CPT

## 2022-10-13 NOTE — ASSESSMENT
[FreeTextEntry1] : 42-year-old female with a status post biopsy of the right breast nodule.  Patient underwent needle biopsy under sonogram guidance.  Biopsy report was benign and was discussed with the patient and follow-up explained.  May return in 6 months

## 2022-10-13 NOTE — PHYSICAL EXAM
[de-identified] : Residual ecchymosis of the right breast at 9 o'clock position with minimal discomfort

## 2022-10-13 NOTE — REASON FOR VISIT
[Follow-Up: _____] : a [unfilled] follow-up visit [FreeTextEntry1] : Pt here for breast check up from last visit 09/15/2022

## 2022-10-13 NOTE — HISTORY OF PRESENT ILLNESS
[FreeTextEntry1] : 42-year-old female was seen last month for a right breast lump and undergone sono-guided biopsy and now has residual ecchymosis and discomfort of the right breast

## 2022-10-13 NOTE — DATA REVIEWED
[FreeTextEntry1] : Final Diagnosis\par Breast, right periareolar mass, ultrasound-guided needle core biopsies:\par - Benign breast tissue with a dominant macrocyst wall fragment associated\par with reactive wall fibrosis, chronic mixed inflammation (lymphocytes,\par plasma cells, and non-necrotizing granulomata), and inspissated\par \par secretion; consistent with an inflamed dilated duct with rupture.\par Verified by: Alfonso Dunlap M.D.\par

## 2022-12-27 ENCOUNTER — NON-APPOINTMENT (OUTPATIENT)
Age: 42
End: 2022-12-27

## 2023-02-08 ENCOUNTER — NON-APPOINTMENT (OUTPATIENT)
Age: 43
End: 2023-02-08

## 2023-02-08 ENCOUNTER — APPOINTMENT (OUTPATIENT)
Dept: OBGYN | Facility: CLINIC | Age: 43
End: 2023-02-08
Payer: COMMERCIAL

## 2023-02-08 DIAGNOSIS — R32 UNSPECIFIED URINARY INCONTINENCE: ICD-10-CM

## 2023-02-08 DIAGNOSIS — Z12.39 ENCOUNTER FOR OTHER SCREENING FOR MALIGNANT NEOPLASM OF BREAST: ICD-10-CM

## 2023-02-08 DIAGNOSIS — N92.0 EXCESSIVE AND FREQUENT MENSTRUATION WITH REGULAR CYCLE: ICD-10-CM

## 2023-02-08 DIAGNOSIS — N92.6 IRREGULAR MENSTRUATION, UNSPECIFIED: ICD-10-CM

## 2023-02-08 DIAGNOSIS — Z11.3 ENCOUNTER FOR SCREENING FOR INFECTIONS WITH A PREDOMINANTLY SEXUAL MODE OF TRANSMISSION: ICD-10-CM

## 2023-02-08 PROCEDURE — 99396 PREV VISIT EST AGE 40-64: CPT

## 2023-02-08 PROCEDURE — 81003 URINALYSIS AUTO W/O SCOPE: CPT | Mod: QW

## 2023-02-08 NOTE — HISTORY OF PRESENT ILLNESS
[Yes] : pregnancy [Mammogramdate] : 9/22 [BreastSonogramDate] : -0- [PapSmeardate] : 2019 [BoneDensityDate] : -0- [ColonoscopyDate] : -0- [LMPDate] : 2/1/23 [PGHxTotal] : 3 [[facility] : [facility] [[facility] : 2 [PGHxABSpont] : 1 [TextBox_6] : 2/1/23 [TextBox_9] : 16 [TextBox_13] : 30 [TextBox_15] : 5 [FreeTextEntry1] : 2/1/23

## 2023-02-08 NOTE — PLAN
[FreeTextEntry1] : WELL WOMAN  EXAM\par URO-GYN URINARY INCONTINENCE\par VITAMINS C/ D/ IRON\par MAMMO\par HORMONE PROFILE, CBC, UA/C&S, TSH\par RTO 6mths/ PRN

## 2023-02-10 LAB
C TRACH RRNA SPEC QL NAA+PROBE: NOT DETECTED
N GONORRHOEA RRNA SPEC QL NAA+PROBE: NOT DETECTED
SOURCE AMPLIFICATION: NORMAL

## 2023-02-13 LAB
CYTOLOGY CVX/VAG DOC THIN PREP: NORMAL
HPV HIGH+LOW RISK DNA PNL CVX: NOT DETECTED
SOURCE AMPLIFICATION: NORMAL
T VAGINALIS RRNA SPEC QL NAA+PROBE: NOT DETECTED

## 2023-03-01 ENCOUNTER — APPOINTMENT (OUTPATIENT)
Dept: OBGYN | Facility: CLINIC | Age: 43
End: 2023-03-01

## 2023-04-18 ENCOUNTER — NON-APPOINTMENT (OUTPATIENT)
Age: 43
End: 2023-04-18

## 2023-04-26 ENCOUNTER — APPOINTMENT (OUTPATIENT)
Dept: BREAST CENTER | Facility: CLINIC | Age: 43
End: 2023-04-26

## 2023-07-10 NOTE — ED ADULT NURSE NOTE - NS ED NURSE LEVEL OF CONSCIOUSNESS MENTAL STATUS
[facility]   Patient's Telephone Number [phone]   [facility] in [address] [place] [address] psychiatrist/therapist Angel Coelho   Awake/Alert

## 2023-10-11 NOTE — ED PROVIDER NOTE - DISCHARGE DATE
10/11/23 0910   Vital Signs   BP Location Left upper arm   Orthostatic B/P and Pulse?  Yes   Blood Pressure Lying 123/71   Pulse Lying 70 PER MINUTE   Blood Pressure Sitting 122/70   Pulse Sitting 79 PER MINUTE   Blood Pressure Standing 123/66   Pulse Standing 76 PER MINUTE 25-Oct-2019

## 2023-10-16 ENCOUNTER — NON-APPOINTMENT (OUTPATIENT)
Age: 43
End: 2023-10-16

## 2023-12-17 ENCOUNTER — NON-APPOINTMENT (OUTPATIENT)
Age: 43
End: 2023-12-17

## 2023-12-31 ENCOUNTER — NON-APPOINTMENT (OUTPATIENT)
Age: 43
End: 2023-12-31

## 2024-02-06 NOTE — ED PROVIDER NOTE - NS ED ATTENDING STATEMENT MOD
Patient presents to the urgent care alone, she states that she was here on 2/1 and diagnosed with influenza B, she was given albuterol, has been taking ibuprofen and theraflu over the counter. She states that she has a productive cough and is worried that she is having something additional wrong like a bacterial infection. She denies shortness of breath, has some chest burning with cough but no chest pain, and has some headache from coughing. Some of her symptoms have improved but she is still coughing a lot.     Recommended changing her over the counter medication to Mucinex DM instead as it can help to thin the mucus so she can get it out easier, increase hydration and use humidification at home as it may help a little better than the teraflu. Explained to the patient that productive cough is typical and that there may not be more that the provider can prescribe, but that we would be happy to see her. She stated that she thinks she needs an antibiotic, explained that viruses can be in our system for 2 weeks causing symptoms and cough is notorious for lasting longer. She states that she will just try these other medications and see how that goes, she didn't know this and got up to go.    Patient doesn't appear in any acute distress. She is able to ambulate and hold a conversation with out coughing or becoming short of breath.    I have personally seen and examined this patient.  I have fully participated in the care of this patient. I have reviewed all pertinent clinical information, including history, physical exam, plan and the Resident’s note and agree except as noted.

## 2024-02-07 ENCOUNTER — APPOINTMENT (OUTPATIENT)
Dept: OBGYN | Facility: CLINIC | Age: 44
End: 2024-02-07
Payer: COMMERCIAL

## 2024-02-07 VITALS
HEART RATE: 80 BPM | OXYGEN SATURATION: 98 % | BODY MASS INDEX: 37.9 KG/M2 | SYSTOLIC BLOOD PRESSURE: 114 MMHG | HEIGHT: 64 IN | DIASTOLIC BLOOD PRESSURE: 78 MMHG | WEIGHT: 222 LBS | TEMPERATURE: 98 F

## 2024-02-07 VITALS — HEIGHT: 64 IN

## 2024-02-07 DIAGNOSIS — Z01.419 ENCOUNTER FOR GYNECOLOGICAL EXAMINATION (GENERAL) (ROUTINE) W/OUT ABNORMAL FINDINGS: ICD-10-CM

## 2024-02-07 LAB
BILIRUB UR QL STRIP: NORMAL
GLUCOSE UR-MCNC: NORMAL
HGB UR QL STRIP.AUTO: NORMAL
KETONES UR-MCNC: NORMAL
LEUKOCYTE ESTERASE UR QL STRIP: NORMAL
NITRITE UR QL STRIP: NORMAL
PROT UR STRIP-MCNC: NORMAL

## 2024-02-07 PROCEDURE — 81003 URINALYSIS AUTO W/O SCOPE: CPT | Mod: QW

## 2024-02-07 PROCEDURE — 99396 PREV VISIT EST AGE 40-64: CPT

## 2024-02-07 NOTE — HISTORY OF PRESENT ILLNESS
[Ultrasound] : ultrasound [FreeTextEntry1] : Pt present for Annual Gyn exam . No complaints  [TextBox_4] : 43 year old female patient present for annual exam. Pt has no complaints.   I,  Juana Mora CMA , personally scribed the services dictated  to me by for Dr Armando Morris DO in this documentation on this date February 7,2023   for .Rakel Barreto.  Juana Pena CMA    [Mammogramdate] : 2022 [BoneDensityDate] : 0 [PapSmeardate] : 2/8/2023 [ColonoscopyDate] : 0 [LMPDate] : 1/15/2024 [PGHxTotal] : 2 [[facility] : 2 [TextBox_27] : [facility]- 2/8/2023- fibroid -1.6 cm

## 2024-02-07 NOTE — PHYSICAL EXAM

## 2024-02-07 NOTE — PLAN
[FreeTextEntry1] : WELL WOMAN EXAM  PAP [facility] MAMMO  COUNSELED PT ON IMPORTANCE OF VITAMINS C/D/E/ NO DYSURIA  RTO 12M

## 2024-02-09 ENCOUNTER — NON-APPOINTMENT (OUTPATIENT)
Age: 44
End: 2024-02-09

## 2024-02-10 LAB
C TRACH RRNA SPEC QL NAA+PROBE: NOT DETECTED
N GONORRHOEA RRNA SPEC QL NAA+PROBE: NOT DETECTED
SOURCE AMPLIFICATION: NORMAL
SOURCE AMPLIFICATION: NORMAL
T VAGINALIS RRNA SPEC QL NAA+PROBE: NOT DETECTED

## 2024-02-12 LAB — HPV HIGH+LOW RISK DNA PNL CVX: NOT DETECTED

## 2024-02-17 ENCOUNTER — OUTPATIENT (OUTPATIENT)
Dept: OUTPATIENT SERVICES | Facility: HOSPITAL | Age: 44
LOS: 1 days | End: 2024-02-17
Payer: COMMERCIAL

## 2024-02-17 ENCOUNTER — RESULT REVIEW (OUTPATIENT)
Age: 44
End: 2024-02-17

## 2024-02-17 DIAGNOSIS — Z12.31 ENCOUNTER FOR SCREENING MAMMOGRAM FOR MALIGNANT NEOPLASM OF BREAST: ICD-10-CM

## 2024-02-17 PROCEDURE — 77063 BREAST TOMOSYNTHESIS BI: CPT | Mod: 26

## 2024-02-17 PROCEDURE — 77067 SCR MAMMO BI INCL CAD: CPT

## 2024-02-17 PROCEDURE — 77067 SCR MAMMO BI INCL CAD: CPT | Mod: 26

## 2024-02-17 PROCEDURE — 77063 BREAST TOMOSYNTHESIS BI: CPT

## 2024-02-18 DIAGNOSIS — Z12.31 ENCOUNTER FOR SCREENING MAMMOGRAM FOR MALIGNANT NEOPLASM OF BREAST: ICD-10-CM

## 2024-02-20 LAB — CYTOLOGY CVX/VAG DOC THIN PREP: NORMAL

## 2024-06-08 NOTE — SWALLOW BEDSIDE ASSESSMENT ADULT - SWALLOW EVAL: CURRENT DIET
Chevy Luo is a 44 year old female presenting to the walk-in clinic today alone for ongoing left side flank pain and swelling. Patient was seen 5/29 for same symptoms. Patient went to [facility] and was told system was down.    Treatment tried prior to visit: none    Swabs/Specimens collected during rooming process:  None    Work, School or  note needed: No    New vs Established: Established    Patient would like communication of their results via:      Cell Phone:   Telephone Information:   Mobile [phone]     Okay to leave a message containing results? Yes     regular solids w/ thin liquids

## 2024-06-18 ENCOUNTER — NON-APPOINTMENT (OUTPATIENT)
Age: 44
End: 2024-06-18

## 2025-02-12 ENCOUNTER — APPOINTMENT (OUTPATIENT)
Dept: OBGYN | Facility: CLINIC | Age: 45
End: 2025-02-12
Payer: COMMERCIAL

## 2025-02-12 ENCOUNTER — NON-APPOINTMENT (OUTPATIENT)
Age: 45
End: 2025-02-12

## 2025-02-12 VITALS
HEART RATE: 81 BPM | WEIGHT: 217 LBS | TEMPERATURE: 98.1 F | DIASTOLIC BLOOD PRESSURE: 66 MMHG | BODY MASS INDEX: 39.93 KG/M2 | HEIGHT: 62 IN | SYSTOLIC BLOOD PRESSURE: 106 MMHG | OXYGEN SATURATION: 98 %

## 2025-02-12 VITALS
HEART RATE: 81 BPM | HEIGHT: 62 IN | BODY MASS INDEX: 39.93 KG/M2 | OXYGEN SATURATION: 98 % | DIASTOLIC BLOOD PRESSURE: 66 MMHG | TEMPERATURE: 98.1 F | SYSTOLIC BLOOD PRESSURE: 106 MMHG | WEIGHT: 217 LBS

## 2025-02-12 DIAGNOSIS — R23.8 OTHER SKIN CHANGES: ICD-10-CM

## 2025-02-12 DIAGNOSIS — Z11.3 ENCOUNTER FOR SCREENING FOR INFECTIONS WITH A PREDOMINANTLY SEXUAL MODE OF TRANSMISSION: ICD-10-CM

## 2025-02-12 DIAGNOSIS — Z98.890 OTHER SPECIFIED POSTPROCEDURAL STATES: ICD-10-CM

## 2025-02-12 DIAGNOSIS — Z12.39 ENCOUNTER FOR OTHER SCREENING FOR MALIGNANT NEOPLASM OF BREAST: ICD-10-CM

## 2025-02-12 DIAGNOSIS — Z11.51 ENCOUNTER FOR SCREENING FOR HUMAN PAPILLOMAVIRUS (HPV): ICD-10-CM

## 2025-02-12 DIAGNOSIS — Z01.419 ENCOUNTER FOR GYNECOLOGICAL EXAMINATION (GENERAL) (ROUTINE) W/OUT ABNORMAL FINDINGS: ICD-10-CM

## 2025-02-12 LAB
BILIRUB UR QL STRIP: NEGATIVE
CLARITY UR: NORMAL
COLLECTION METHOD: NORMAL
GLUCOSE UR-MCNC: NEGATIVE
HCG UR QL: 0.2 EU/DL
HGB UR QL STRIP.AUTO: NORMAL
KETONES UR-MCNC: NEGATIVE
LEUKOCYTE ESTERASE UR QL STRIP: NORMAL
NITRITE UR QL STRIP: NEGATIVE
PH UR STRIP: 6
PROT UR STRIP-MCNC: NEGATIVE
SP GR UR STRIP: 1.02

## 2025-02-12 PROCEDURE — 99396 PREV VISIT EST AGE 40-64: CPT

## 2025-02-12 PROCEDURE — 81003 URINALYSIS AUTO W/O SCOPE: CPT | Mod: QW

## 2025-02-12 PROCEDURE — 99459 PELVIC EXAMINATION: CPT

## 2025-02-12 RX ORDER — NYSTATIN AND TRIAMCINOLONE ACETONIDE 100000; 1 [USP'U]/G; MG/G
100000-0.1 OINTMENT TOPICAL TWICE DAILY
Qty: 1 | Refills: 0 | Status: ACTIVE | COMMUNITY
Start: 2025-02-12 | End: 1900-01-01

## 2025-02-14 LAB
C TRACH RRNA SPEC QL NAA+PROBE: NOT DETECTED
HPV HIGH+LOW RISK DNA PNL CVX: NOT DETECTED
N GONORRHOEA RRNA SPEC QL NAA+PROBE: NOT DETECTED
SOURCE AMPLIFICATION: NORMAL

## 2025-02-15 LAB
SOURCE AMPLIFICATION: NORMAL
T VAGINALIS RRNA SPEC QL NAA+PROBE: NOT DETECTED

## 2025-02-19 LAB — CYTOLOGY CVX/VAG DOC THIN PREP: NORMAL

## 2025-03-04 ENCOUNTER — RESULT REVIEW (OUTPATIENT)
Age: 45
End: 2025-03-04

## 2025-03-04 ENCOUNTER — OUTPATIENT (OUTPATIENT)
Dept: OUTPATIENT SERVICES | Facility: HOSPITAL | Age: 45
LOS: 1 days | End: 2025-03-04
Payer: COMMERCIAL

## 2025-03-04 DIAGNOSIS — R92.2 INCONCLUSIVE MAMMOGRAM: ICD-10-CM

## 2025-03-04 DIAGNOSIS — Z12.31 ENCOUNTER FOR SCREENING MAMMOGRAM FOR MALIGNANT NEOPLASM OF BREAST: ICD-10-CM

## 2025-03-04 PROCEDURE — 76641 ULTRASOUND BREAST COMPLETE: CPT | Mod: 50

## 2025-03-04 PROCEDURE — 77067 SCR MAMMO BI INCL CAD: CPT

## 2025-03-04 PROCEDURE — 77063 BREAST TOMOSYNTHESIS BI: CPT

## 2025-03-04 PROCEDURE — 77067 SCR MAMMO BI INCL CAD: CPT | Mod: 26

## 2025-03-04 PROCEDURE — 76641 ULTRASOUND BREAST COMPLETE: CPT | Mod: 26,50

## 2025-03-04 PROCEDURE — 77063 BREAST TOMOSYNTHESIS BI: CPT | Mod: 26

## 2025-03-05 DIAGNOSIS — R92.2 INCONCLUSIVE MAMMOGRAM: ICD-10-CM

## 2025-03-05 DIAGNOSIS — Z12.31 ENCOUNTER FOR SCREENING MAMMOGRAM FOR MALIGNANT NEOPLASM OF BREAST: ICD-10-CM

## 2025-03-17 ENCOUNTER — EMERGENCY (EMERGENCY)
Facility: HOSPITAL | Age: 45
LOS: 0 days | Discharge: HOME | End: 2025-03-18
Attending: EMERGENCY MEDICINE
Payer: COMMERCIAL

## 2025-03-17 VITALS
WEIGHT: 214.95 LBS | TEMPERATURE: 99 F | DIASTOLIC BLOOD PRESSURE: 103 MMHG | SYSTOLIC BLOOD PRESSURE: 133 MMHG | HEIGHT: 62 IN | OXYGEN SATURATION: 99 % | RESPIRATION RATE: 18 BRPM | HEART RATE: 88 BPM

## 2025-03-17 VITALS
SYSTOLIC BLOOD PRESSURE: 135 MMHG | RESPIRATION RATE: 18 BRPM | OXYGEN SATURATION: 99 % | HEART RATE: 84 BPM | DIASTOLIC BLOOD PRESSURE: 84 MMHG

## 2025-03-17 DIAGNOSIS — R35.0 FREQUENCY OF MICTURITION: ICD-10-CM

## 2025-03-17 DIAGNOSIS — Z87.442 PERSONAL HISTORY OF URINARY CALCULI: ICD-10-CM

## 2025-03-17 DIAGNOSIS — N39.0 URINARY TRACT INFECTION, SITE NOT SPECIFIED: ICD-10-CM

## 2025-03-17 DIAGNOSIS — R30.0 DYSURIA: ICD-10-CM

## 2025-03-17 DIAGNOSIS — M54.50 LOW BACK PAIN, UNSPECIFIED: ICD-10-CM

## 2025-03-17 DIAGNOSIS — Z88.0 ALLERGY STATUS TO PENICILLIN: ICD-10-CM

## 2025-03-17 LAB
ALBUMIN SERPL ELPH-MCNC: 4.2 G/DL — SIGNIFICANT CHANGE UP (ref 3.5–5.2)
ALP SERPL-CCNC: 62 U/L — SIGNIFICANT CHANGE UP (ref 30–115)
ALT FLD-CCNC: 22 U/L — SIGNIFICANT CHANGE UP (ref 0–41)
ANION GAP SERPL CALC-SCNC: 10 MMOL/L — SIGNIFICANT CHANGE UP (ref 7–14)
APPEARANCE UR: ABNORMAL
AST SERPL-CCNC: 19 U/L — SIGNIFICANT CHANGE UP (ref 0–41)
BACTERIA # UR AUTO: ABNORMAL /HPF
BASOPHILS # BLD AUTO: 0.04 K/UL — SIGNIFICANT CHANGE UP (ref 0–0.2)
BASOPHILS NFR BLD AUTO: 0.5 % — SIGNIFICANT CHANGE UP (ref 0–1)
BILIRUB SERPL-MCNC: 0.3 MG/DL — SIGNIFICANT CHANGE UP (ref 0.2–1.2)
BILIRUB UR-MCNC: NEGATIVE — SIGNIFICANT CHANGE UP
BUN SERPL-MCNC: 16 MG/DL — SIGNIFICANT CHANGE UP (ref 10–20)
CALCIUM SERPL-MCNC: 9 MG/DL — SIGNIFICANT CHANGE UP (ref 8.4–10.5)
CAST: 0 /LPF — SIGNIFICANT CHANGE UP (ref 0–4)
CHLORIDE SERPL-SCNC: 102 MMOL/L — SIGNIFICANT CHANGE UP (ref 98–110)
CO2 SERPL-SCNC: 28 MMOL/L — SIGNIFICANT CHANGE UP (ref 17–32)
COLOR SPEC: YELLOW — SIGNIFICANT CHANGE UP
CREAT SERPL-MCNC: 0.9 MG/DL — SIGNIFICANT CHANGE UP (ref 0.7–1.5)
DIFF PNL FLD: ABNORMAL
EGFR: 82 ML/MIN/1.73M2 — SIGNIFICANT CHANGE UP
EGFR: 82 ML/MIN/1.73M2 — SIGNIFICANT CHANGE UP
EOSINOPHIL # BLD AUTO: 0.16 K/UL — SIGNIFICANT CHANGE UP (ref 0–0.7)
EOSINOPHIL NFR BLD AUTO: 1.9 % — SIGNIFICANT CHANGE UP (ref 0–8)
GLUCOSE SERPL-MCNC: 84 MG/DL — SIGNIFICANT CHANGE UP (ref 70–99)
GLUCOSE UR QL: NEGATIVE MG/DL — SIGNIFICANT CHANGE UP
HCT VFR BLD CALC: 39.8 % — SIGNIFICANT CHANGE UP (ref 37–47)
HGB BLD-MCNC: 13.6 G/DL — SIGNIFICANT CHANGE UP (ref 12–16)
IMM GRANULOCYTES NFR BLD AUTO: 0.2 % — SIGNIFICANT CHANGE UP (ref 0.1–0.3)
KETONES UR-MCNC: NEGATIVE MG/DL — SIGNIFICANT CHANGE UP
LEUKOCYTE ESTERASE UR-ACNC: ABNORMAL
LYMPHOCYTES # BLD AUTO: 1.6 K/UL — SIGNIFICANT CHANGE UP (ref 1.2–3.4)
LYMPHOCYTES # BLD AUTO: 19.5 % — LOW (ref 20.5–51.1)
MCHC RBC-ENTMCNC: 30.4 PG — SIGNIFICANT CHANGE UP (ref 27–31)
MCHC RBC-ENTMCNC: 34.2 G/DL — SIGNIFICANT CHANGE UP (ref 32–37)
MCV RBC AUTO: 89 FL — SIGNIFICANT CHANGE UP (ref 81–99)
MONOCYTES # BLD AUTO: 0.55 K/UL — SIGNIFICANT CHANGE UP (ref 0.1–0.6)
MONOCYTES NFR BLD AUTO: 6.7 % — SIGNIFICANT CHANGE UP (ref 1.7–9.3)
NEUTROPHILS # BLD AUTO: 5.85 K/UL — SIGNIFICANT CHANGE UP (ref 1.4–6.5)
NEUTROPHILS NFR BLD AUTO: 71.2 % — SIGNIFICANT CHANGE UP (ref 42.2–75.2)
NITRITE UR-MCNC: NEGATIVE — SIGNIFICANT CHANGE UP
NRBC BLD AUTO-RTO: 0 /100 WBCS — SIGNIFICANT CHANGE UP (ref 0–0)
PH UR: 6.5 — SIGNIFICANT CHANGE UP (ref 5–8)
PLATELET # BLD AUTO: 243 K/UL — SIGNIFICANT CHANGE UP (ref 130–400)
PMV BLD: 9.2 FL — SIGNIFICANT CHANGE UP (ref 7.4–10.4)
POTASSIUM SERPL-MCNC: 4.2 MMOL/L — SIGNIFICANT CHANGE UP (ref 3.5–5)
POTASSIUM SERPL-SCNC: 4.2 MMOL/L — SIGNIFICANT CHANGE UP (ref 3.5–5)
PROT SERPL-MCNC: 6.8 G/DL — SIGNIFICANT CHANGE UP (ref 6–8)
PROT UR-MCNC: 100 MG/DL
RBC # BLD: 4.47 M/UL — SIGNIFICANT CHANGE UP (ref 4.2–5.4)
RBC # FLD: 12.7 % — SIGNIFICANT CHANGE UP (ref 11.5–14.5)
RBC CASTS # UR COMP ASSIST: 416 /HPF — HIGH (ref 0–4)
SODIUM SERPL-SCNC: 140 MMOL/L — SIGNIFICANT CHANGE UP (ref 135–146)
SP GR SPEC: 1.02 — SIGNIFICANT CHANGE UP (ref 1–1.03)
SQUAMOUS # UR AUTO: 4 /HPF — SIGNIFICANT CHANGE UP (ref 0–5)
UROBILINOGEN FLD QL: 0.2 MG/DL — SIGNIFICANT CHANGE UP (ref 0.2–1)
WBC # BLD: 8.22 K/UL — SIGNIFICANT CHANGE UP (ref 4.8–10.8)
WBC # FLD AUTO: 8.22 K/UL — SIGNIFICANT CHANGE UP (ref 4.8–10.8)
WBC UR QL: 825 /HPF — HIGH (ref 0–5)

## 2025-03-17 PROCEDURE — 99285 EMERGENCY DEPT VISIT HI MDM: CPT

## 2025-03-17 PROCEDURE — 36415 COLL VENOUS BLD VENIPUNCTURE: CPT

## 2025-03-17 PROCEDURE — 87086 URINE CULTURE/COLONY COUNT: CPT

## 2025-03-17 PROCEDURE — 96374 THER/PROPH/DIAG INJ IV PUSH: CPT | Mod: XU

## 2025-03-17 PROCEDURE — 80053 COMPREHEN METABOLIC PANEL: CPT

## 2025-03-17 PROCEDURE — 85025 COMPLETE CBC W/AUTO DIFF WBC: CPT

## 2025-03-17 PROCEDURE — 74177 CT ABD & PELVIS W/CONTRAST: CPT | Mod: MC

## 2025-03-17 PROCEDURE — 81001 URINALYSIS AUTO W/SCOPE: CPT

## 2025-03-17 PROCEDURE — 96361 HYDRATE IV INFUSION ADD-ON: CPT

## 2025-03-17 PROCEDURE — 74177 CT ABD & PELVIS W/CONTRAST: CPT | Mod: 26

## 2025-03-17 PROCEDURE — 87186 SC STD MICRODIL/AGAR DIL: CPT

## 2025-03-17 PROCEDURE — 99284 EMERGENCY DEPT VISIT MOD MDM: CPT | Mod: 25

## 2025-03-17 RX ORDER — PHENAZOPYRIDINE HCL 100 MG
200 TABLET ORAL ONCE
Refills: 0 | Status: COMPLETED | OUTPATIENT
Start: 2025-03-17 | End: 2025-03-17

## 2025-03-17 RX ORDER — SODIUM CHLORIDE 9 G/1000ML
2000 INJECTION, SOLUTION INTRAVENOUS ONCE
Refills: 0 | Status: COMPLETED | OUTPATIENT
Start: 2025-03-17 | End: 2025-03-17

## 2025-03-17 RX ORDER — CEFPODOXIME PROXETIL 200 MG/1
200 TABLET, FILM COATED ORAL EVERY 12 HOURS
Refills: 0 | Status: DISCONTINUED | OUTPATIENT
Start: 2025-03-17 | End: 2025-03-18

## 2025-03-17 RX ORDER — CEFPODOXIME PROXETIL 200 MG/1
1 TABLET, FILM COATED ORAL
Qty: 14 | Refills: 0
Start: 2025-03-17 | End: 2025-03-23

## 2025-03-17 RX ORDER — KETOROLAC TROMETHAMINE 30 MG/ML
15 INJECTION, SOLUTION INTRAMUSCULAR; INTRAVENOUS ONCE
Refills: 0 | Status: DISCONTINUED | OUTPATIENT
Start: 2025-03-17 | End: 2025-03-17

## 2025-03-17 RX ADMIN — Medication 200 MILLIGRAM(S): at 20:21

## 2025-03-17 RX ADMIN — SODIUM CHLORIDE 2000 MILLILITER(S): 9 INJECTION, SOLUTION INTRAVENOUS at 20:21

## 2025-03-17 RX ADMIN — CEFPODOXIME PROXETIL 200 MILLIGRAM(S): 200 TABLET, FILM COATED ORAL at 20:21

## 2025-03-17 RX ADMIN — KETOROLAC TROMETHAMINE 15 MILLIGRAM(S): 30 INJECTION, SOLUTION INTRAMUSCULAR; INTRAVENOUS at 20:21

## 2025-03-17 RX ADMIN — SODIUM CHLORIDE 2000 MILLILITER(S): 9 INJECTION, SOLUTION INTRAVENOUS at 21:46

## 2025-03-17 NOTE — ED PROVIDER NOTE - PHYSICAL EXAMINATION
CONSTITUTIONAL: Well-appearing; in no apparent distress.   EYES: PERRL; EOM intact.   CARDIOVASCULAR: Normal S1, S2; no murmurs, rubs, or gallops.   RESPIRATORY: Normal chest excursion with respiration; breath sounds clear and equal bilaterally; no wheezes, rhonchi, or rales.  GI: Suprapubic tenderness.  Left CVA tenderness.  Normal bowel sounds; non-distended; no palpable organomegaly.   MS: No calf swelling and tenderness.  SKIN: No rash to back  NEURO/PSYCH: A & O x 4; grossly unremarkable.

## 2025-03-17 NOTE — ED PROVIDER NOTE - CLINICAL SUMMARY MEDICAL DECISION MAKING FREE TEXT BOX
45 yo F with PMH of frequent UTI's, kidney stones, appendectomy, cholecystectomy presents to the ER for 3 days of low back pain, and with lower abdomen pain associated with frequency/burning/blood with urination. Pt states she saw her doctor and was told to start abx (Macrobid) which she hasn't taken yet. Pt also hasn't taken any ibuprofen or tylenol for pain. Pt feels some nausea now but no vomiting/diarrhea/fever/CP/SOB/dizziness/rash/trauma/HA/leg swelling/calf tenderness. Pap smear done on 2/12/25--was neg for trich/HPV/malignancy    All test results reviewed. Pt with no pyelo. Pt has with frequent UTI's therefore will place on cefpodoxime and refer to urogyn for further evaluation. Return precautions provided. Recommend TYlenol and ibuprofen for pain prn.

## 2025-03-17 NOTE — ED PROVIDER NOTE - NSFOLLOWUPINSTRUCTIONS_ED_ALL_ED_FT
Our Emergency Department Referral Coordinators will be reaching out to you in the next 24-48 hours from 9:00am to 5:00pm to schedule a follow up appointment. Please expect a phone call from the hospital in that time frame. If you do not receive a call or if you have any questions or concerns, you can reach them at   [phone]      Urinary Tract Infection    A urinary tract infection (UTI) is an infection of any part of the urinary tract, which includes the kidneys, ureters, bladder, and urethra. Risk factors include ignoring your need to urinate, wiping back to front if female, being an uncircumcised male, and having diabetes or a weak immune system. Symptoms include frequent urination, pain or burning with urination, foul smelling urine, cloudy urine, pain in the lower abdomen, blood in the urine, and fever. If you were prescribed an antibiotic medicine, take it as told by your health care provider. Do not stop taking the antibiotic even if you start to feel better.    SEEK IMMEDIATE MEDICAL CARE IF YOU HAVE THE FOLLOWING SYMPTOMS: severe back or abdominal pain, inability to keep fluids or medicine down, dizziness/lightheadedness, or a change in mental status.

## 2025-03-17 NOTE — ED PROVIDER NOTE - PROGRESS NOTE DETAILS
YF pt given cefpodoxime in the ER with no side effects or reactions. To dc with same med to cover her for UTI.

## 2025-03-17 NOTE — ED PROVIDER NOTE - OBJECTIVE STATEMENT
44 years old female history of frequent UTI, kidney stone, appendectomy, cholecystectomy presents complaints of dysuria, urinary frequency associated with lower back pain worsening over the past 3 days.  Reports she was treated for UTI about a month ago.  Having a bowel symptoms again so she was seen by her primary care doctor who recommended her to start on antibiotic again.  Having more pain this evening so she comes to ED for evaluation.  Also report nausea and chills associated with the flank pain.  Otherwise denies fever, headache, dizziness, chest pain, shortness of breath, upper abdominal pain, vomiting, diarrhea, vaginal bleeding or discharge.

## 2025-03-17 NOTE — ED PROVIDER NOTE - CARE PROVIDER_API CALL
[facility]  [address] [place] [id number]  Phone: [phone]  Fax: [phone]  Follow Up Time: 7-10 Days

## 2025-03-17 NOTE — ED PROVIDER NOTE - ATTENDING APP SHARED VISIT CONTRIBUTION OF CARE
45 yo F with PMH of frequent UTI's, kidney stones, appendectomy, cholecystectomy presents to the ER for 3 days of low back pain, and with lower abdomen pain associated with frequency/burning/blood with urination. Pt states she saw her doctor and was told to start abx (Macrobid) which she hasn't taken yet. Pt also hasn't taken any ibuprofen or tylenol for pain. Pt feels some nausea now but no vomiting/diarrhea/fever/CP/SOB/dizziness/rash/trauma/HA/leg swelling/calf tenderness    Exam as per PA note. Check labs, urine, CT scan of a/p and pain meds, IVFs, antinausea meds.     ALL: penicillin-->throat closes  PMH as above  Meds: MVI, allergy meds  SH no smoking  PMD [place]  LMP: last week, has regular periods  Ph[facility]acy: [facility] [address] 43 yo F with PMH of frequent UTI's, kidney stones, appendectomy, cholecystectomy presents to the ER for 3 days of low back pain, and with lower abdomen pain associated with frequency/burning/blood with urination. Pt states she saw her doctor and was told to start abx (Macrobid) which she hasn't taken yet. Pt also hasn't taken any ibuprofen or tylenol for pain. Pt feels some nausea now but no vomiting/diarrhea/fever/CP/SOB/dizziness/rash/trauma/HA/leg swelling/calf tenderness. Pap smear done on 2/12/25--was neg for trich/HPV/malignancy    Exam as per PA note. Check labs, urine, CT scan of a/p and pain meds, IVFs, antinausea meds.     ALL: penicillin-->throat closes  PMH as above  Meds: MVI, allergy meds  SH no smoking  PMD [place]  LMP: last week, has regular periods  Pharmacy: [facility] [address]

## 2025-03-17 NOTE — ED PROVIDER NOTE - PATIENT PORTAL LINK FT
You can access the FollowMyHealth Patient Portal offered by [facility] by registering at the following website: [email or web address]. By joining [facility]’s FollowMyHealth portal, you will also be able to view your health information using other applications (apps) compatible with our system.

## 2025-03-17 NOTE — ED ADULT NURSE NOTE - NSFALLCONCLUSION_ED_ALL_ED
Pt calling stating that allergist can no longer give her her Xolair shot at their office due to her new insurance this year. Needs to get this shot as soon as possible. Please advise where she can get this done at or if she needs a referral for it. [employer]

## 2025-03-18 DIAGNOSIS — Z90.49 ACQUIRED ABSENCE OF OTHER SPECIFIED PARTS OF DIGESTIVE TRACT: Chronic | ICD-10-CM

## 2025-03-18 PROBLEM — Z78.9 OTHER SPECIFIED HEALTH STATUS: Chronic | Status: INACTIVE | Noted: 2019-10-25 | Resolved: 2025-03-18

## 2025-03-18 NOTE — CHART NOTE - NSCHARTNOTEFT_GEN_A_CORE
Spoke to pt. Pt self scheduled follow up appt for 7/9 @ [address] (urogynecology referral) CT 3/18.

## 2025-05-30 ENCOUNTER — APPOINTMENT (OUTPATIENT)
Dept: UROGYNECOLOGY | Facility: CLINIC | Age: 45
End: 2025-05-30
Payer: COMMERCIAL

## 2025-05-30 VITALS
SYSTOLIC BLOOD PRESSURE: 97 MMHG | HEART RATE: 91 BPM | HEIGHT: 62 IN | WEIGHT: 219 LBS | DIASTOLIC BLOOD PRESSURE: 66 MMHG | BODY MASS INDEX: 40.3 KG/M2

## 2025-05-30 DIAGNOSIS — R23.8 OTHER SKIN CHANGES: ICD-10-CM

## 2025-05-30 DIAGNOSIS — Z98.890 OTHER SPECIFIED POSTPROCEDURAL STATES: ICD-10-CM

## 2025-05-30 DIAGNOSIS — Z01.419 ENCOUNTER FOR GYNECOLOGICAL EXAMINATION (GENERAL) (ROUTINE) W/OUT ABNORMAL FINDINGS: ICD-10-CM

## 2025-05-30 DIAGNOSIS — Z87.898 PERSONAL HISTORY OF OTHER SPECIFIED CONDITIONS: ICD-10-CM

## 2025-05-30 DIAGNOSIS — N92.6 IRREGULAR MENSTRUATION, UNSPECIFIED: ICD-10-CM

## 2025-05-30 DIAGNOSIS — Z12.4 ENCOUNTER FOR SCREENING FOR MALIGNANT NEOPLASM OF CERVIX: ICD-10-CM

## 2025-05-30 DIAGNOSIS — M79.18 MYALGIA, OTHER SITE: ICD-10-CM

## 2025-05-30 DIAGNOSIS — Z11.3 ENCOUNTER FOR SCREENING FOR INFECTIONS WITH A PREDOMINANTLY SEXUAL MODE OF TRANSMISSION: ICD-10-CM

## 2025-05-30 DIAGNOSIS — Z87.42 PERSONAL HISTORY OF OTHER DISEASES OF THE FEMALE GENITAL TRACT: ICD-10-CM

## 2025-05-30 DIAGNOSIS — Z87.440 PERSONAL HISTORY OF URINARY (TRACT) INFECTIONS: ICD-10-CM

## 2025-05-30 DIAGNOSIS — B37.9 CANDIDIASIS, UNSPECIFIED: ICD-10-CM

## 2025-05-30 DIAGNOSIS — R92.8 OTHER ABNORMAL AND INCONCLUSIVE FINDINGS ON DIAGNOSTIC IMAGING OF BREAST: ICD-10-CM

## 2025-05-30 DIAGNOSIS — N60.42 MAMMARY DUCT ECTASIA OF LEFT BREAST: ICD-10-CM

## 2025-05-30 DIAGNOSIS — Z12.39 ENCOUNTER FOR OTHER SCREENING FOR MALIGNANT NEOPLASM OF BREAST: ICD-10-CM

## 2025-05-30 DIAGNOSIS — Z11.51 ENCOUNTER FOR SCREENING FOR HUMAN PAPILLOMAVIRUS (HPV): ICD-10-CM

## 2025-05-30 DIAGNOSIS — N60.91 UNSPECIFIED BENIGN MAMMARY DYSPLASIA OF RIGHT BREAST: ICD-10-CM

## 2025-05-30 DIAGNOSIS — N39.46 MIXED INCONTINENCE: ICD-10-CM

## 2025-05-30 DIAGNOSIS — N64.4 MASTODYNIA: ICD-10-CM

## 2025-05-30 DIAGNOSIS — Z86.19 PERSONAL HISTORY OF OTHER INFECTIOUS AND PARASITIC DISEASES: ICD-10-CM

## 2025-05-30 PROCEDURE — 51701 INSERT BLADDER CATHETER: CPT

## 2025-05-30 PROCEDURE — 99459 PELVIC EXAMINATION: CPT

## 2025-05-30 PROCEDURE — 99205 OFFICE O/P NEW HI 60 MIN: CPT | Mod: 25

## 2025-05-30 RX ORDER — VIBEGRON 75 MG/1
75 TABLET, FILM COATED ORAL
Qty: 30 | Refills: 2 | Status: ACTIVE | COMMUNITY
Start: 2025-05-30 | End: 1900-01-01

## 2025-06-02 ENCOUNTER — NON-APPOINTMENT (OUTPATIENT)
Age: 45
End: 2025-06-02

## 2025-06-04 LAB — URINE CULTURE <10: NORMAL

## 2025-06-19 ENCOUNTER — APPOINTMENT (OUTPATIENT)
Dept: OBGYN | Facility: CLINIC | Age: 45
End: 2025-06-19
Payer: COMMERCIAL

## 2025-06-19 VITALS
OXYGEN SATURATION: 97 % | DIASTOLIC BLOOD PRESSURE: 70 MMHG | SYSTOLIC BLOOD PRESSURE: 100 MMHG | TEMPERATURE: 98 F | WEIGHT: 217 LBS | BODY MASS INDEX: 39.69 KG/M2 | HEART RATE: 75 BPM

## 2025-06-19 PROCEDURE — 99212 OFFICE O/P EST SF 10 MIN: CPT

## 2025-06-19 RX ORDER — CIPROFLOXACIN HYDROCHLORIDE 500 MG/1
500 TABLET, FILM COATED ORAL
Qty: 10 | Refills: 0 | Status: ACTIVE | COMMUNITY
Start: 2025-06-19 | End: 1900-01-01

## 2025-07-08 ENCOUNTER — APPOINTMENT (OUTPATIENT)
Dept: UROGYNECOLOGY | Facility: CLINIC | Age: 45
End: 2025-07-08